# Patient Record
Sex: FEMALE | Race: WHITE | NOT HISPANIC OR LATINO | Employment: FULL TIME | URBAN - METROPOLITAN AREA
[De-identification: names, ages, dates, MRNs, and addresses within clinical notes are randomized per-mention and may not be internally consistent; named-entity substitution may affect disease eponyms.]

---

## 2017-08-11 ENCOUNTER — TRANSCRIBE ORDERS (OUTPATIENT)
Dept: ADMINISTRATIVE | Facility: HOSPITAL | Age: 47
End: 2017-08-11

## 2017-08-11 DIAGNOSIS — Z12.31 VISIT FOR SCREENING MAMMOGRAM: Primary | ICD-10-CM

## 2017-09-28 ENCOUNTER — HOSPITAL ENCOUNTER (OUTPATIENT)
Dept: MAMMOGRAPHY | Facility: HOSPITAL | Age: 47
Discharge: HOME | End: 2017-09-28
Attending: OBSTETRICS & GYNECOLOGY | Admitting: OBSTETRICS & GYNECOLOGY

## 2017-09-28 DIAGNOSIS — Z12.31 VISIT FOR SCREENING MAMMOGRAM: ICD-10-CM

## 2017-09-28 PROCEDURE — 77063 BREAST TOMOSYNTHESIS BI: CPT

## 2017-09-28 PROCEDURE — G0202 SCR MAMMO BI INCL CAD: HCPCS

## 2017-10-02 PROCEDURE — 77067 SCR MAMMO BI INCL CAD: CPT | Performed by: RADIOLOGY

## 2017-10-02 PROCEDURE — 77063 BREAST TOMOSYNTHESIS BI: CPT | Performed by: RADIOLOGY

## 2019-06-05 ENCOUNTER — TRANSCRIBE ORDERS (OUTPATIENT)
Dept: CT IMAGING | Facility: HOSPITAL | Age: 49
End: 2019-06-05

## 2019-06-05 ENCOUNTER — HOSPITAL ENCOUNTER (OUTPATIENT)
Dept: CT IMAGING | Facility: HOSPITAL | Age: 49
Discharge: HOME | End: 2019-06-05
Admitting: FAMILY MEDICINE

## 2019-06-05 DIAGNOSIS — E78.5 HYPERLIPIDEMIA, UNSPECIFIED HYPERLIPIDEMIA TYPE: ICD-10-CM

## 2019-06-05 DIAGNOSIS — R07.9 CHEST PAIN, UNSPECIFIED TYPE: Primary | ICD-10-CM

## 2019-06-05 DIAGNOSIS — R07.9 CHEST PAIN, UNSPECIFIED TYPE: ICD-10-CM

## 2019-06-05 PROCEDURE — 71275 CT ANGIOGRAPHY CHEST: CPT

## 2019-06-05 PROCEDURE — 25010000002 IOPAMIDOL 61 % SOLUTION: Performed by: FAMILY MEDICINE

## 2019-06-05 RX ADMIN — IOPAMIDOL 100 ML: 612 INJECTION, SOLUTION INTRAVENOUS at 17:10

## 2020-10-29 ENCOUNTER — CLINICAL SUPPORT (OUTPATIENT)
Dept: OBSTETRICS AND GYNECOLOGY | Facility: CLINIC | Age: 50
End: 2020-10-29

## 2020-10-29 DIAGNOSIS — Z30.42 ENCOUNTER FOR DEPO-PROVERA CONTRACEPTION: Primary | ICD-10-CM

## 2020-10-29 PROCEDURE — 96372 THER/PROPH/DIAG INJ SC/IM: CPT | Performed by: NURSE PRACTITIONER

## 2020-10-29 RX ORDER — MEDROXYPROGESTERONE ACETATE 150 MG/ML
150 INJECTION, SUSPENSION INTRAMUSCULAR ONCE
Status: COMPLETED | OUTPATIENT
Start: 2020-10-29 | End: 2020-10-29

## 2020-10-29 RX ADMIN — MEDROXYPROGESTERONE ACETATE 150 MG: 150 INJECTION, SUSPENSION INTRAMUSCULAR at 10:48

## 2021-01-08 ENCOUNTER — IMMUNIZATION (OUTPATIENT)
Dept: VACCINE CLINIC | Facility: HOSPITAL | Age: 51
End: 2021-01-08

## 2021-01-08 PROCEDURE — 91301 HC SARSCO02 VAC 100MCG/0.5ML IM: CPT | Performed by: INTERNAL MEDICINE

## 2021-01-08 PROCEDURE — 0011A: CPT | Performed by: INTERNAL MEDICINE

## 2021-01-26 ENCOUNTER — CLINICAL SUPPORT (OUTPATIENT)
Dept: OBSTETRICS AND GYNECOLOGY | Facility: CLINIC | Age: 51
End: 2021-01-26

## 2021-01-26 DIAGNOSIS — Z30.42 ENCOUNTER FOR DEPO-PROVERA CONTRACEPTION: Primary | ICD-10-CM

## 2021-01-26 PROCEDURE — 96372 THER/PROPH/DIAG INJ SC/IM: CPT | Performed by: OBSTETRICS & GYNECOLOGY

## 2021-01-26 RX ORDER — MEDROXYPROGESTERONE ACETATE 150 MG/ML
150 INJECTION, SUSPENSION INTRAMUSCULAR ONCE
Status: COMPLETED | OUTPATIENT
Start: 2021-01-26 | End: 2021-01-26

## 2021-01-26 RX ADMIN — MEDROXYPROGESTERONE ACETATE 150 MG: 150 INJECTION, SUSPENSION INTRAMUSCULAR at 12:25

## 2021-02-01 ENCOUNTER — IMMUNIZATION (OUTPATIENT)
Dept: VACCINE CLINIC | Facility: HOSPITAL | Age: 51
End: 2021-02-01

## 2021-02-01 PROCEDURE — 0012A: CPT | Performed by: INTERNAL MEDICINE

## 2021-02-01 PROCEDURE — 91301 HC SARSCO02 VAC 100MCG/0.5ML IM: CPT | Performed by: INTERNAL MEDICINE

## 2021-04-20 ENCOUNTER — CLINICAL SUPPORT (OUTPATIENT)
Dept: OBSTETRICS AND GYNECOLOGY | Facility: CLINIC | Age: 51
End: 2021-04-20

## 2021-04-20 VITALS
HEIGHT: 68 IN | DIASTOLIC BLOOD PRESSURE: 72 MMHG | SYSTOLIC BLOOD PRESSURE: 116 MMHG | BODY MASS INDEX: 37.59 KG/M2 | WEIGHT: 248 LBS

## 2021-04-20 DIAGNOSIS — Z30.42 SURVEILLANCE FOR DEPO-PROVERA CONTRACEPTION: Primary | ICD-10-CM

## 2021-04-20 PROCEDURE — 96372 THER/PROPH/DIAG INJ SC/IM: CPT | Performed by: OBSTETRICS & GYNECOLOGY

## 2021-04-20 RX ORDER — MEDROXYPROGESTERONE ACETATE 150 MG/ML
150 INJECTION, SUSPENSION INTRAMUSCULAR ONCE
Status: COMPLETED | OUTPATIENT
Start: 2021-04-20 | End: 2021-04-20

## 2021-04-20 RX ADMIN — MEDROXYPROGESTERONE ACETATE 150 MG: 150 INJECTION, SUSPENSION INTRAMUSCULAR at 12:50

## 2021-04-20 NOTE — PROGRESS NOTES
DepoProvera Contraception Note  Marnie An is a 50 y.o. female here for her DepoProvera injection. She weighs 248 lbs and she is 5feet 8inches tall. Marnie An is sexually active. The patient's last pap smear was 07/31/2020. Her last injection was 01/26/2021. Her past reactions to this injection include:none. It has been exactly 12 weeks since her last injection. Her last annual exam was 07/31/2020. Give left hip - pt tolerated well. Weight same 248 lbs - planning to start working out    Patient Questions  Have you ever passed out from an injection? No     Have you ever had a reaction to an injection? No    Are you allergic to any medications? No    Have you been sick in the last month? No    Injection Details  Noted in the MAR.  Office supplied depo    Next Injection  Her next injection is 07/13/2021 to 7/20/21  Her annual is 8/3/2021.    Electronically Signed By:  Arjun Santacruz RN  04/20/2021

## 2021-07-13 ENCOUNTER — CLINICAL SUPPORT (OUTPATIENT)
Dept: OBSTETRICS AND GYNECOLOGY | Facility: CLINIC | Age: 51
End: 2021-07-13

## 2021-07-13 VITALS
SYSTOLIC BLOOD PRESSURE: 122 MMHG | WEIGHT: 251 LBS | DIASTOLIC BLOOD PRESSURE: 82 MMHG | HEIGHT: 68 IN | BODY MASS INDEX: 38.04 KG/M2

## 2021-07-13 DIAGNOSIS — Z30.42 SURVEILLANCE FOR DEPO-PROVERA CONTRACEPTION: Primary | ICD-10-CM

## 2021-07-13 PROCEDURE — 96372 THER/PROPH/DIAG INJ SC/IM: CPT | Performed by: OBSTETRICS & GYNECOLOGY

## 2021-07-13 RX ORDER — MEDROXYPROGESTERONE ACETATE 150 MG/ML
INJECTION, SUSPENSION INTRAMUSCULAR
COMMUNITY
End: 2021-07-13 | Stop reason: SDUPTHER

## 2021-07-13 RX ORDER — FEXOFENADINE HCL 180 MG/1
TABLET ORAL
COMMUNITY
End: 2023-01-05

## 2021-07-13 RX ORDER — MEDROXYPROGESTERONE ACETATE 150 MG/ML
150 INJECTION, SUSPENSION INTRAMUSCULAR ONCE
Status: COMPLETED | OUTPATIENT
Start: 2021-07-13 | End: 2021-07-13

## 2021-07-13 RX ORDER — ROSUVASTATIN CALCIUM 10 MG/1
10 TABLET, COATED ORAL DAILY
COMMUNITY
Start: 2021-07-03

## 2021-07-13 RX ADMIN — MEDROXYPROGESTERONE ACETATE 150 MG: 150 INJECTION, SUSPENSION INTRAMUSCULAR at 11:04

## 2021-07-13 NOTE — PROGRESS NOTES
DepoProvera Contraception Note  Marnie An is a 51 y.o. female here for her DepoProvera injection.She weighs 251lbs and she  is 5feet 8inches tall. Marnie An is sexually active. The patient's last pap smear was 7/20 . Her last injection was 4-20-21. It has been  12 weeks since her last injection. Her last annual exam was 7/20.    Patient Questions  Have you ever passed out from an injection? No     Have you ever had a reaction to an injection? No    Are you allergic to any medications? See drug allergies    Have you been sick in the last month? No    Injection Details  Noted in the MAR.    Next Injection  Her next injection is 10/5/2021    Electronically Signed By:  Sandy Montgomery MA  07/13/2021          Pt uncle was able to reach pt mother per telephone who is at work and she states she is not coming since she is at work. .  Pt restless, vomited x1, no pills found in vomit.        Monika Bill RN  01/18/20 1730

## 2021-08-09 ENCOUNTER — OFFICE VISIT (OUTPATIENT)
Dept: OBSTETRICS AND GYNECOLOGY | Facility: CLINIC | Age: 51
End: 2021-08-09

## 2021-08-09 VITALS
WEIGHT: 248 LBS | SYSTOLIC BLOOD PRESSURE: 128 MMHG | DIASTOLIC BLOOD PRESSURE: 80 MMHG | BODY MASS INDEX: 37.59 KG/M2 | HEIGHT: 68 IN

## 2021-08-09 DIAGNOSIS — Z01.419 PAP TEST, AS PART OF ROUTINE GYNECOLOGICAL EXAMINATION: Primary | ICD-10-CM

## 2021-08-09 DIAGNOSIS — Z30.42 ENCOUNTER FOR SURVEILLANCE OF INJECTABLE CONTRACEPTIVE: ICD-10-CM

## 2021-08-09 PROCEDURE — 99396 PREV VISIT EST AGE 40-64: CPT | Performed by: OBSTETRICS & GYNECOLOGY

## 2021-08-09 RX ORDER — MEDROXYPROGESTERONE ACETATE 150 MG/ML
150 INJECTION, SUSPENSION INTRAMUSCULAR
COMMUNITY
End: 2022-09-06 | Stop reason: SDUPTHER

## 2021-08-09 RX ORDER — HALOBETASOL PROPIONATE 0.05 %
OINTMENT (GRAM) TOPICAL
COMMUNITY
Start: 2021-07-15

## 2021-08-09 NOTE — PROGRESS NOTES
GYN Annual Exam     CC - Here for annual exam.   Last BDS 2018  On Depo for 20 plus years    Subjective   HPI  Marnie An is a 51 y.o. female, , who presents for annual well woman exam.   Patient's last menstrual period was 2005 (exact date)..  Periods are absent with Depo  Dysmenorrhea:none.  Patient reports problems with: some night sweats. Some bilateral breast tenderness   Partner Status: Marital Status: .  New Partners since last visit:  no.  Desires STD Screening:  no.    Additional OB/GYN History   Current contraception: Depo last injection 2021  Last Pap :   Last Completed Pap Smear          Ordered - PAP SMEAR (Every 3 Years) Ordered on 2020  Done - in [place]    2018  Done - in [place]              History of abnormal Pap smear: no  Family history of uterine, colon, breast, or ovarian cancer: yes PGM breast cancer  Performs monthly Self-Breast Exam: yes  Last mammogram: overdue for mamm - pt will call to schedule NEEDS ORDER  Last Completed Mammogram     This patient has no relevant Health Maintenance data.        Feelings of Anxiety or Depression: no  Tobacco Usage?: No   OB History        1    Para   1    Term   0       1    AB        Living   1       SAB        TAB        Ectopic        Molar        Multiple        Live Births   1                Health Maintenance   Topic Date Due   • Annual Gynecologic Pelvic and Breast Exam  Never done   • COLORECTAL CANCER SCREENING  Never done   • ANNUAL PHYSICAL  Never done   • TDAP/TD VACCINES (1 - Tdap) Never done   • HEPATITIS C SCREENING  Never done   • MAMMOGRAM  2021   • LIPID PANEL  2021   • INFLUENZA VACCINE  10/01/2021   • DXA SCAN  10/29/2022   • PAP SMEAR  2023   • COVID-19 Vaccine  Completed   • Pneumococcal Vaccine 0-64  Aged Out         Current Outpatient Medications:   •  fexofenadine (Allegra Allergy) 180 MG tablet, Allegra, Disp: , Rfl:   •  halobetasol  "(ULTRAVATE) 0.05 % ointment, APPLY TWO TIMES A DAY    TO AFFECTED AREA AS NEEDED FOR 14 DAYS, Disp: , Rfl:   •  medroxyPROGESTERone (Depo-Provera) 150 MG/ML injection, Inject 150 mg into the appropriate muscle as directed by prescriber Every 3 (Three) Months., Disp: , Rfl:   •  rosuvastatin (CRESTOR) 10 MG tablet, Take 10 mg by mouth Daily. 200001, Disp: , Rfl:     The additional following portions of the patient's history were reviewed and updated as appropriate: current medications, past family history, past medical history, past social history, past surgical history and problem list.    Review of Systems   Constitutional: Negative.    HENT: Negative.    Eyes: Negative.    Respiratory: Negative.    Cardiovascular: Negative.    Gastrointestinal: Negative.    Endocrine: Negative.    Genitourinary: Negative.    Musculoskeletal: Negative.    Skin: Negative.    Allergic/Immunologic: Negative.    Neurological: Negative.    Hematological: Negative.    Psychiatric/Behavioral: Negative.        I have reviewed and agree with the HPI, ROS, and historical information as entered above. Collins Boykin MD    Objective   /80   Ht 172.7 cm (68\")   Wt 112 kg (248 lb)   LMP 12/01/2005 (Exact Date)   Breastfeeding No   BMI 37.71 kg/m²     PE    Breast: Without masses ,nontender, no skin changes or retractions  Axilla: Normal, no lymphadenopathy  Heart: Regular rate no murmurs rubs or gallops  Lungs: Clear to auscultation, normal breath sounds bilaterally  Abdomen: Soft nontender, no hepatosplenomegaly, no guarding or rebound, no masses  Pelvic exam  External genitalia: Normal introitus and vulva  Vagina: Normal mucosa no bleeding inflammation or discharge  Bladder: Normal position nontender  Urethral meatus and urethra: Normal nontender  Cervix: No lesions, no discharge, bleeding or inflammation  Bimanual: Nontender adnexa clear, no sign of uterine or ovarian enlargement       Assessment/Plan     Assessment "     Problem List Items Addressed This Visit        Genitourinary and Reproductive     Pap test, as part of routine gynecological examination - Primary    Relevant Orders    Pap IG, HPV-hr    Encounter for surveillance of injectable contraceptive          1. GYN annual well woman exam.   2. Continue Depo-Provera every 3 months    Plan     1. Fibrocystic breast changes - Encouraged decreasing caffeine, supportive bra, low dose vitamin E supplementation.  2. Reviewed HPV guidelines  3. Reviewed monthly self breast exams.  Instructed to call with lumps, pain, or breast discharge.  Yearly mammograms ordered.  Depo-Provera every 3 months  Preventative health initiatives reviewed    Collins Boykin MD  08/09/2021

## 2021-08-24 ENCOUNTER — TRANSCRIBE ORDERS (OUTPATIENT)
Dept: ADMINISTRATIVE | Facility: HOSPITAL | Age: 51
End: 2021-08-24

## 2021-08-24 DIAGNOSIS — Z12.31 VISIT FOR SCREENING MAMMOGRAM: Primary | ICD-10-CM

## 2021-08-31 ENCOUNTER — OFFICE VISIT (OUTPATIENT)
Dept: ORTHOPEDIC SURGERY | Facility: CLINIC | Age: 51
End: 2021-08-31

## 2021-08-31 VITALS
HEIGHT: 68 IN | DIASTOLIC BLOOD PRESSURE: 74 MMHG | RESPIRATION RATE: 18 BRPM | TEMPERATURE: 96.9 F | WEIGHT: 250 LBS | BODY MASS INDEX: 37.89 KG/M2 | SYSTOLIC BLOOD PRESSURE: 120 MMHG

## 2021-08-31 DIAGNOSIS — M25.562 ARTHRALGIA OF LEFT KNEE: Primary | ICD-10-CM

## 2021-08-31 DIAGNOSIS — M76.32 IT BAND SYNDROME, LEFT: ICD-10-CM

## 2021-08-31 DIAGNOSIS — M17.11 PRIMARY OSTEOARTHRITIS OF RIGHT KNEE: ICD-10-CM

## 2021-08-31 DIAGNOSIS — M22.2X1 PATELLOFEMORAL DISORDER OF BOTH KNEES: ICD-10-CM

## 2021-08-31 DIAGNOSIS — M17.12 PRIMARY OSTEOARTHRITIS OF LEFT KNEE: ICD-10-CM

## 2021-08-31 DIAGNOSIS — M22.2X2 PATELLOFEMORAL DISORDER OF BOTH KNEES: ICD-10-CM

## 2021-08-31 DIAGNOSIS — M17.0 PRIMARY OSTEOARTHRITIS OF BOTH KNEES: ICD-10-CM

## 2021-08-31 PROCEDURE — 99203 OFFICE O/P NEW LOW 30 MIN: CPT | Performed by: PHYSICIAN ASSISTANT

## 2021-08-31 PROCEDURE — 20610 DRAIN/INJ JOINT/BURSA W/O US: CPT | Performed by: PHYSICIAN ASSISTANT

## 2021-08-31 RX ORDER — TRIAMCINOLONE ACETONIDE 40 MG/ML
40 INJECTION, SUSPENSION INTRA-ARTICULAR; INTRAMUSCULAR
Status: COMPLETED | OUTPATIENT
Start: 2021-08-31 | End: 2021-08-31

## 2021-08-31 RX ORDER — LIDOCAINE HYDROCHLORIDE 10 MG/ML
2 INJECTION, SOLUTION INFILTRATION; PERINEURAL
Status: COMPLETED | OUTPATIENT
Start: 2021-08-31 | End: 2021-08-31

## 2021-08-31 RX ADMIN — LIDOCAINE HYDROCHLORIDE 2 ML: 10 INJECTION, SOLUTION INFILTRATION; PERINEURAL at 10:08

## 2021-08-31 RX ADMIN — TRIAMCINOLONE ACETONIDE 40 MG: 40 INJECTION, SUSPENSION INTRA-ARTICULAR; INTRAMUSCULAR at 10:08

## 2021-08-31 NOTE — PROGRESS NOTES
"Subjective   Patient ID: Marnie An is a 51 y.o. right hand dominant female  Pain of the Left Knee (States her right knee aches at night for around 6 months to a year, her left knee has sharp pains and has been locking up and clicking for 3-6 months.) and Pain of the Right Knee         History of Present Illness  Patient presents as a new patient with complaints of bilateral knee discomfort left greater than right.  She states the right knee does have an aching sensation worse at night.  The left knee experiences sharp pains that clicks and will occasionally lock up.  This has been ongoing for the last 6 months.  She does take occasional Tylenol.  She cannot tolerate anti-inflammatories due to \"kidney issues\"  Patient denies numbness or tingling to the lower extremities.  Patient also mentions at times her left knee feels \"weak\"                                                 Past Medical History:   Diagnosis Date   • Esophageal stricture     stretched, [place]   • Hyperlipidemia     pt and family        Past Surgical History:   Procedure Laterality Date   • BREAST BIOPSY Right 07/12/2012    Ultrasound guided   • KNEE ARTHROSCOPY  03/2014    right torn meniscus        Family History   Problem Relation Age of Onset   • Breast cancer Paternal Grandmother         80's   • Hearing loss Mother    • Heart disease Mother    • Hyperlipidemia Mother    • Hypertension Mother    • Heart disease Father    • Hyperlipidemia Father    • Ovarian cancer Neg Hx        Social History     Socioeconomic History   • Marital status:      Spouse name: Not on file   • Number of children: Not on file   • Years of education: Not on file   • Highest education level: Not on file   Tobacco Use   • Smoking status: Never Smoker   • Smokeless tobacco: Never Used   Substance and Sexual Activity   • Alcohol use: Yes     Comment: social   • Drug use: Never         Current Outpatient Medications:   •  fexofenadine (Allegra Allergy) " "180 MG tablet, Allegra, Disp: , Rfl:   •  halobetasol (ULTRAVATE) 0.05 % ointment, APPLY TWO TIMES A DAY    TO AFFECTED AREA AS NEEDED FOR 14 DAYS, Disp: , Rfl:   •  medroxyPROGESTERone (Depo-Provera) 150 MG/ML injection, Inject 150 mg into the appropriate muscle as directed by prescriber Every 3 (Three) Months., Disp: , Rfl:   •  rosuvastatin (CRESTOR) 10 MG tablet, Take 10 mg by mouth Daily. 200001, Disp: , Rfl:     Allergies   Allergen Reactions   • E.E.S. [Erythromycin] Other (See Comments)     Abdominal pain       Review of Systems   Constitutional: Negative for fever.   HENT: Negative for dental problem and voice change.    Eyes: Negative for visual disturbance.   Respiratory: Negative for shortness of breath.    Cardiovascular: Negative for chest pain.   Gastrointestinal: Negative for abdominal pain.   Genitourinary: Negative for dysuria.   Musculoskeletal: Positive for arthralgias (bilateral knee) and joint swelling (bilateral knee). Negative for gait problem.   Skin: Negative for rash.   Neurological: Negative for speech difficulty.   Hematological: Does not bruise/bleed easily.   Psychiatric/Behavioral: Negative for confusion.       I have reviewed the medical and surgical history, family history, social history, medications, and/or allergies, and the review of systems of this report.    Objective   /74 (BP Location: Left arm, Patient Position: Sitting, Cuff Size: Large Adult)   Temp 96.9 °F (36.1 °C)   Resp 18   Ht 172.7 cm (67.99\")   Wt 113 kg (250 lb)   LMP  (LMP Unknown)   Breastfeeding No   BMI 38.02 kg/m²    Physical Exam  Vitals and nursing note reviewed.   Constitutional:       Appearance: Normal appearance.   Pulmonary:      Effort: Pulmonary effort is normal.   Musculoskeletal:      Right knee: No effusion.      Left knee: No effusion.      Instability Tests: Medial Prasanth test negative and lateral Prasanth test negative.   Neurological:      Mental Status: She is alert and " oriented to person, place, and time.       Right Knee Exam     Tenderness   The patient is experiencing tenderness in the medial joint line.    Other   Erythema: absent  Effusion: no effusion present      Left Knee Exam     Tenderness   The patient is experiencing tenderness in the lateral joint line and medial joint line (distal IT band).    Tests   Prasanth:  Medial - negative Lateral - negative  Varus: positive Valgus: negative  Lachman:  Anterior - negative    Posterior - negative  Patellar apprehension: 1+    Other   Erythema: absent  Sensation: normal  Pulse: present  Effusion: no effusion present           Extremity DVT signs are negative on physical exam with negative Samir sign, no calf pain, no palpable cords and no skin tone change   Neurologic Exam     Mental Status   Oriented to person, place, and time.          Positive bilateral knee crepitus    Assessment/Plan   Independent Review of Radiographic Studies:    X-ray of the left knee 2 view weightbearing performed in the office reveals medial joint space narrowing with patellofemoral degenerative changes.  No acute fracture.  The AP view of the right knee does reveal severe medial joint space bone-on-bone interaction without acute process      Large Joint Arthrocentesis: R knee  Date/Time: 8/31/2021 10:08 AM  Consent given by: patient  Site marked: site marked  Timeout: Immediately prior to procedure a time out was called to verify the correct patient, procedure, equipment, support staff and site/side marked as required   Supporting Documentation  Indications: pain   Procedure Details  Location: knee - R knee  Preparation: Patient was prepped and draped in the usual sterile fashion  Needle size: 22 G  Approach: anterolateral  Medications administered: 2 mL lidocaine 1 %; 40 mg triamcinolone acetonide 40 MG/ML  Patient tolerance: patient tolerated the procedure well with no immediate complications    Large Joint Arthrocentesis: L knee  Date/Time:  8/31/2021 10:08 AM  Consent given by: patient  Site marked: site marked  Timeout: Immediately prior to procedure a time out was called to verify the correct patient, procedure, equipment, support staff and site/side marked as required   Supporting Documentation  Indications: pain   Procedure Details  Location: knee - L knee  Preparation: Patient was prepped and draped in the usual sterile fashion  Needle size: 22 G  Approach: anterolateral  Medications administered: 2 mL lidocaine 1 %; 40 mg triamcinolone acetonide 40 MG/ML  Patient tolerance: patient tolerated the procedure well with no immediate complications             Diagnoses and all orders for this visit:    1. Arthralgia of left knee (Primary)  -     XR Knee 1 or 2 View Left; Future  -     Ambulatory Referral to Physical Therapy Evaluate and treat, Ortho; Heat, Electrotherapy; Ultrasound; Strengthening (quad/vmo), ROM, Stretching    2. Patellofemoral disorder of both knees  -     Ambulatory Referral to Physical Therapy Evaluate and treat, Ortho; Heat, Electrotherapy; Ultrasound; Strengthening (quad/vmo), ROM, Stretching    3. It band syndrome, left  -     Ambulatory Referral to Physical Therapy Evaluate and treat, Ortho; Heat, Electrotherapy; Ultrasound; Strengthening (quad/vmo), ROM, Stretching    4. Primary osteoarthritis of both knees    5. Primary osteoarthritis of right knee    6. Primary osteoarthritis of left knee    Other orders  -     Large Joint Arthrocentesis: R knee  -     Large Joint Arthrocentesis: L knee       Orthopedic activities reviewed and patient expressed appreciation  Discussion of orthopedic goals  Risk, benefits, and merits of treatment alternatives reviewed with the patient and questions answered  Call or notify for any adverse effect from injection therapy  Reduced physical activity as appropriate  Weight bearing parameters reviewed  Ice, heat, and/or modalities as beneficial    Recommendations/Plan:  Exercise, medications,  injections, other patient advice, and return appointment as noted.  Patient is encouraged to call or return for any issues or concerns.    Follow up in 3 months-if no improvement patient would benefit from MRI left knee    Patient agreeable to call or return sooner for any concerns.               EMR Dragon-transcription disclaimer:  This encounter note is an electronic transcription of spoken language to printed text.  Electronic transcription of spoken language may permit erroneous or at times nonsensical words or phrases to be inadvertently transcribed.  Although I have reviewed the note for such errors, some may still exist

## 2021-09-23 ENCOUNTER — TRANSCRIBE ORDERS (OUTPATIENT)
Dept: LAB | Facility: HOSPITAL | Age: 51
End: 2021-09-23

## 2021-09-23 ENCOUNTER — LAB (OUTPATIENT)
Dept: LAB | Facility: HOSPITAL | Age: 51
End: 2021-09-23

## 2021-09-23 DIAGNOSIS — Z20.822 COVID-19 RULED OUT: Primary | ICD-10-CM

## 2021-09-23 DIAGNOSIS — Z20.822 COVID-19 RULED OUT: ICD-10-CM

## 2021-09-23 PROCEDURE — U0004 COV-19 TEST NON-CDC HGH THRU: HCPCS

## 2021-09-24 LAB — SARS-COV-2 RNA NOSE QL NAA+PROBE: NOT DETECTED

## 2021-10-05 ENCOUNTER — CLINICAL SUPPORT (OUTPATIENT)
Dept: OBSTETRICS AND GYNECOLOGY | Facility: CLINIC | Age: 51
End: 2021-10-05

## 2021-10-05 DIAGNOSIS — Z30.42 ENCOUNTER FOR SURVEILLANCE OF INJECTABLE CONTRACEPTIVE: Primary | ICD-10-CM

## 2021-10-05 PROCEDURE — 96372 THER/PROPH/DIAG INJ SC/IM: CPT | Performed by: OBSTETRICS & GYNECOLOGY

## 2021-10-05 RX ORDER — MEDROXYPROGESTERONE ACETATE 150 MG/ML
150 INJECTION, SUSPENSION INTRAMUSCULAR ONCE
Status: COMPLETED | OUTPATIENT
Start: 2021-10-05 | End: 2021-10-05

## 2021-10-05 RX ADMIN — MEDROXYPROGESTERONE ACETATE 150 MG: 150 INJECTION, SUSPENSION INTRAMUSCULAR at 09:09

## 2021-10-05 NOTE — PROGRESS NOTES
DepoProvera Contraception Note  Marnie An is a 51 y.o. female here for her DepoProvera injection. Her LMP was N/A. She weighs 246lbs and she is 6feet 8inches tall. Marnie An is sexually active. The patient's last pap smear was 8/9/21. Her last injection was 7/13/21. Her past reactions to this injection include:none. It has been less than 12 weeks since her last injection. Her last annual exam was 8/9/21.    Patient Questions  Have you ever passed out from an injection? No     Have you ever had a reaction to an injection? No    Are you allergic to any medications? No    Have you been sick in the last month? No    Injection Details  Noted in the MAR.    Next Injection  Her next injection is 12/28/21.    Electronically Signed By:  Zully Callaway RN  10/05/2021

## 2021-10-11 ENCOUNTER — HOSPITAL ENCOUNTER (OUTPATIENT)
Dept: MAMMOGRAPHY | Facility: HOSPITAL | Age: 51
Discharge: HOME | End: 2021-10-11
Admitting: OBSTETRICS & GYNECOLOGY

## 2021-10-11 DIAGNOSIS — Z12.31 VISIT FOR SCREENING MAMMOGRAM: ICD-10-CM

## 2021-10-11 PROCEDURE — 77063 BREAST TOMOSYNTHESIS BI: CPT | Performed by: RADIOLOGY

## 2021-10-11 PROCEDURE — 77063 BREAST TOMOSYNTHESIS BI: CPT

## 2021-10-11 PROCEDURE — 77067 SCR MAMMO BI INCL CAD: CPT

## 2021-10-11 PROCEDURE — 77067 SCR MAMMO BI INCL CAD: CPT | Performed by: RADIOLOGY

## 2021-12-28 ENCOUNTER — CLINICAL SUPPORT (OUTPATIENT)
Dept: OBSTETRICS AND GYNECOLOGY | Facility: CLINIC | Age: 51
End: 2021-12-28

## 2021-12-28 VITALS
SYSTOLIC BLOOD PRESSURE: 126 MMHG | WEIGHT: 246 LBS | HEIGHT: 68 IN | DIASTOLIC BLOOD PRESSURE: 80 MMHG | BODY MASS INDEX: 37.28 KG/M2

## 2021-12-28 DIAGNOSIS — Z30.42 SURVEILLANCE FOR DEPO-PROVERA CONTRACEPTION: Primary | ICD-10-CM

## 2021-12-28 PROCEDURE — 96372 THER/PROPH/DIAG INJ SC/IM: CPT | Performed by: OBSTETRICS & GYNECOLOGY

## 2021-12-28 RX ORDER — MEDROXYPROGESTERONE ACETATE 150 MG/ML
150 INJECTION, SUSPENSION INTRAMUSCULAR ONCE
Status: COMPLETED | OUTPATIENT
Start: 2021-12-28 | End: 2021-12-28

## 2021-12-28 RX ADMIN — MEDROXYPROGESTERONE ACETATE 150 MG: 150 INJECTION, SUSPENSION INTRAMUSCULAR at 15:35

## 2021-12-28 NOTE — PROGRESS NOTES
DepoProvera Contraception Note  Marnie An is a 51 y.o. female here for her DepoProvera injection. Her LMP was years ago. She weighs 245 lbslbs and she is 5feet 8inches tall. Marnie An is sexually active. The patient's last pap smear was 08/2021. Her last injection was10/05/2021. Her past reactions to this injection include:none. It has been less than 12 weeks since her last injection. Her last annual exam was [id number].    Patient Questions  Have you ever passed out from an injection? No     Have you ever had a reaction to an injection? No    Are you allergic to any medications? Yes EES - GI issues    Have you been sick in the last month? No    Injection Details  Noted in the MAR. Give right upper outer quadrant, 25 gauge needle 1 1/2 in, pt tolerated well    Next Injection  Her next injection is 03/22/2022.    Electronically Signed By:  Arjun Santacruz RN  12/28/2021

## 2022-03-22 ENCOUNTER — CLINICAL SUPPORT (OUTPATIENT)
Dept: OBSTETRICS AND GYNECOLOGY | Facility: CLINIC | Age: 52
End: 2022-03-22

## 2022-03-22 VITALS — WEIGHT: 250.8 LBS | BODY MASS INDEX: 38.13 KG/M2

## 2022-03-22 DIAGNOSIS — Z30.42 SURVEILLANCE FOR DEPO-PROVERA CONTRACEPTION: Primary | ICD-10-CM

## 2022-03-22 PROCEDURE — 96372 THER/PROPH/DIAG INJ SC/IM: CPT | Performed by: OBSTETRICS & GYNECOLOGY

## 2022-03-22 RX ORDER — MEDROXYPROGESTERONE ACETATE 150 MG/ML
150 INJECTION, SUSPENSION INTRAMUSCULAR ONCE
Status: COMPLETED | OUTPATIENT
Start: 2022-03-22 | End: 2022-03-22

## 2022-03-22 RX ADMIN — MEDROXYPROGESTERONE ACETATE 150 MG: 150 INJECTION, SUSPENSION INTRAMUSCULAR at 09:22

## 2022-03-22 NOTE — PROGRESS NOTES
DepoProvera Contraception Note  Marnie An is a 51 y.o. female here for her DepoProvera injection.. She weighs 250.8lbs lbs and she is 5feet 7inches tall. Marnie An is sexually active and is not sexually active. The patient's last pap smear was 8/9/21. Her last injection was 12/28/21. Her past reactions to this injection include:none. It has been more than 12 weeks since her last injection. Her last annual exam was 8/9/21.    Patient Questions  Have you ever passed out from an injection? No     Have you ever had a reaction to an injection? No    Are you allergic to any medications? No    Have you been sick in the last month? No    Injection Details  Noted in the MAR.    Next Injection  Her next injection is 6/14/22    Electronically Signed By:  Elana Mello, PCT  03/22/2022

## 2022-06-14 ENCOUNTER — CLINICAL SUPPORT (OUTPATIENT)
Dept: OBSTETRICS AND GYNECOLOGY | Facility: CLINIC | Age: 52
End: 2022-06-14

## 2022-06-14 VITALS
DIASTOLIC BLOOD PRESSURE: 70 MMHG | SYSTOLIC BLOOD PRESSURE: 124 MMHG | HEIGHT: 68 IN | WEIGHT: 245.8 LBS | BODY MASS INDEX: 37.25 KG/M2

## 2022-06-14 DIAGNOSIS — Z30.9 ENCOUNTER FOR CONTRACEPTIVE MANAGEMENT, UNSPECIFIED TYPE: Primary | ICD-10-CM

## 2022-06-14 PROCEDURE — 96372 THER/PROPH/DIAG INJ SC/IM: CPT | Performed by: NURSE PRACTITIONER

## 2022-06-14 RX ORDER — MEDROXYPROGESTERONE ACETATE 150 MG/ML
150 INJECTION, SUSPENSION INTRAMUSCULAR ONCE
Status: COMPLETED | OUTPATIENT
Start: 2022-06-14 | End: 2022-06-14

## 2022-06-14 RX ADMIN — MEDROXYPROGESTERONE ACETATE 150 MG: 150 INJECTION, SUSPENSION INTRAMUSCULAR at 12:54

## 2022-06-14 NOTE — PROGRESS NOTES
DepoProvera Contraception Note  Marnie An is a 52 y.o. female here for her Depo Provera injection. Her LMP was unknown due Depo Provera injection. She weighs 245lbs and she is 5feet 8inches tall. Mranie An is sexually active. The patient's last pap smear was 08/09/2021. Her last injection was 03/22/22. Her past reactions to this injection include:none. It has been 12 weeks since her last injection. Her last annual exam was 08/09/2021.    Patient Questions  Have you ever passed out from an injection? No     Have you ever had a reaction to an injection? No    Are you allergic to any medications? No    Have you been sick in the last month? No    Injection Details  Noted in the MAR.    Next Injection  Her next injection is  The week of 09/06/2022.    Electronically Signed By:  Myrna Goel MA  06/14/2022

## 2022-08-29 ENCOUNTER — TRANSCRIBE ORDERS (OUTPATIENT)
Dept: ADMINISTRATIVE | Facility: HOSPITAL | Age: 52
End: 2022-08-29

## 2022-08-29 DIAGNOSIS — Z12.31 SCREENING MAMMOGRAM FOR BREAST CANCER: Primary | ICD-10-CM

## 2022-09-06 ENCOUNTER — CLINICAL SUPPORT (OUTPATIENT)
Dept: OBSTETRICS AND GYNECOLOGY | Facility: CLINIC | Age: 52
End: 2022-09-06

## 2022-09-06 DIAGNOSIS — Z30.42 SURVEILLANCE FOR DEPO-PROVERA CONTRACEPTION: Primary | ICD-10-CM

## 2022-09-06 PROCEDURE — 96372 THER/PROPH/DIAG INJ SC/IM: CPT | Performed by: OBSTETRICS & GYNECOLOGY

## 2022-09-06 RX ORDER — MEDROXYPROGESTERONE ACETATE 150 MG/ML
150 INJECTION, SUSPENSION INTRAMUSCULAR
Status: SHIPPED | OUTPATIENT
Start: 2022-09-06

## 2022-09-06 RX ADMIN — MEDROXYPROGESTERONE ACETATE 150 MG: 150 INJECTION, SUSPENSION INTRAMUSCULAR at 09:12

## 2022-09-06 NOTE — PROGRESS NOTES
DepoProvera Contraception Note  Marnie An is a 52 y.o. female here for her DepoProvera injection. Her LMP was unknown. She weighs 249 lbs and she is 5feet 8inches tall. Marnie An is sexually active. The patient's last pap smear was 8/9/21. Her last injection was 6/14/22. Her past reactions to this injection include:none. It has been 12 weeks since her last injection. Her last annual exam was 8/9/21.    Patient Questions  Have you ever passed out from an injection? No     Have you ever had a reaction to an injection? No    Are you allergic to any medications? No    Have you been sick in the last month? No    Injection Details  Noted in the MAR.    Next Injection  Her next injection is 11/29/22.    Electronically Signed By:  Sarah Thomson RN  09/06/2022

## 2022-09-15 ENCOUNTER — OFFICE VISIT (OUTPATIENT)
Dept: OBSTETRICS AND GYNECOLOGY | Facility: CLINIC | Age: 52
End: 2022-09-15

## 2022-09-15 VITALS
WEIGHT: 251.6 LBS | DIASTOLIC BLOOD PRESSURE: 76 MMHG | BODY MASS INDEX: 38.13 KG/M2 | HEIGHT: 68 IN | SYSTOLIC BLOOD PRESSURE: 128 MMHG

## 2022-09-15 DIAGNOSIS — Z12.11 COLON CANCER SCREENING: ICD-10-CM

## 2022-09-15 DIAGNOSIS — Z01.419 WOMEN'S ANNUAL ROUTINE GYNECOLOGICAL EXAMINATION: Primary | ICD-10-CM

## 2022-09-15 DIAGNOSIS — Z12.31 BREAST CANCER SCREENING BY MAMMOGRAM: ICD-10-CM

## 2022-09-15 PROCEDURE — 99396 PREV VISIT EST AGE 40-64: CPT | Performed by: OBSTETRICS & GYNECOLOGY

## 2022-09-15 NOTE — PROGRESS NOTES
Gynecologic Annual Exam Note          GYN Annual Exam     Gynecologic Exam        Subjective     HPI  Marnie An is a 52 y.o. female, , who presents for annual well woman exam as a established patient . No LMP recorded. Patient has had an injection..  Periods are absent secondary to birth control. She states she is content with Depo injections. Patient reports problems with: none.  Partner Status: Marital Status: . She is is sexually active. She has not had new partners.. STD testing recommendations have been explained to the patient and she does not desire STD testing. There were no changes to her medical or surgical history since her last visit.. She does have knee replacement surgery scheduled for November.       Additional OB/GYN History   Current contraception: contraceptive methods: Depo-Provera injections  Desires to: continue contraception    Last Pap : 2021. Result: negative. HPV: negative  Last Completed Pap Smear          Ordered - PAP SMEAR (Every 3 Years) Ordered on 9/15/2022    2021  SCANNED - PAP SMEAR    2020  Done - in [place]    2018  Done - in [place]              History of abnormal Pap smear: no  Family history of uterine, colon, breast, or ovarian cancer: yes - paternal GM - breast  Performs monthly Self-Breast Exam: no  Last mammogram: 10/17/2021. Done at [facility]    Last Completed Mammogram          Scheduled - MAMMOGRAM (Yearly) Scheduled for 10/14/2022    10/11/2021  Mammo Screening Digital Tomosynthesis Bilateral With CAD    2017  Mammo Screening Digital Tomosynthesis Bilateral With CAD    09/15/2016  Mammo screening digital tomosynthesis bilateral w cad    2015  MAMMOGRAPHY SCREENING BILATERAL    08/15/2014  MAMMOGRAPHY DIAGNOSTIC BILATERAL                History of abnormal mammogram: yes - had biopsy, benign    Colonoscopy: has never had a colonoscopy.  Exercises Regularly: no  Feelings of Anxiety or Depression: no  Tobacco  Usage?: No       Current Outpatient Medications:   •  fexofenadine (ALLEGRA) 180 MG tablet, Allegra, Disp: , Rfl:   •  halobetasol (ULTRAVATE) 0.05 % ointment, APPLY TWO TIMES A DAY    TO AFFECTED AREA AS NEEDED FOR 14 DAYS, Disp: , Rfl:   •  rosuvastatin (CRESTOR) 10 MG tablet, Take 10 mg by mouth Daily. , Disp: , Rfl:     Current Facility-Administered Medications:   •  medroxyPROGESTERone (DEPO-PROVERA) injection 150 mg, 150 mg, Intramuscular, Q3 Months, Ann-Marie Acevedo MD, 150 mg at 22     Patient denies the need for medication refills today.    OB History        1    Para   1    Term   0       1    AB        Living   1       SAB        IAB        Ectopic        Molar        Multiple        Live Births   1                Past Medical History:   Diagnosis Date   • Esophageal stricture     stretched, [facility] [place]   • Hematuria 2021    CT SCAN and Cystoscopy with Dr. Fajardo   • Hyperlipidemia     pt and family        Past Surgical History:   Procedure Laterality Date   • BREAST BIOPSY Right 2012    Ultrasound guided   • CYSTOSCOPY     • ENDOSCOPY     • KNEE ARTHROSCOPY  2014    right torn meniscus        Health Maintenance   Topic Date Due   • COLORECTAL CANCER SCREENING  Never done   • ANNUAL PHYSICAL  Never done   • TDAP/TD VACCINES (1 - Tdap) Never done   • HEPATITIS C SCREENING  Never done   • LIPID PANEL  2021   • COVID-19 Vaccine (4 - Booster) 2022   • ZOSTER VACCINE (1 of 2) Never done   • Annual Gynecologic Pelvic and Breast Exam  08/10/2022   • INFLUENZA VACCINE  10/01/2022   • MAMMOGRAM  10/11/2022   • DXA SCAN  10/29/2022   • PAP SMEAR  2024   • Pneumococcal Vaccine 0-64  Aged Out       The additional following portions of the patient's history were reviewed and updated as appropriate: allergies, current medications, past family history, past medical history, past social history and past surgical history.    Review of Systems   Constitutional:  "Negative.    HENT: Negative.    Eyes: Negative.    Respiratory: Negative.    Cardiovascular: Negative.    Gastrointestinal: Negative.    Endocrine: Negative.    Genitourinary: Negative.    Musculoskeletal: Negative.    Allergic/Immunologic: Negative.    Neurological: Negative.    Hematological: Negative.    Psychiatric/Behavioral: Negative.          I have reviewed and agree with the HPI, ROS, and historical information as entered above. Ann-Marie Acevedo MD      Objective   /76   Ht 172.7 cm (68\")   Wt 114 kg (251 lb 9.6 oz)   BMI 38.26 kg/m²     Physical Exam  Vitals and nursing note reviewed. Exam conducted with a chaperone present.   Constitutional:       General: She is awake.   HENT:      Head: Normocephalic and atraumatic.   Neck:      Thyroid: No thyroid mass, thyromegaly or thyroid tenderness.   Cardiovascular:      Rate and Rhythm: Normal rate.      Heart sounds: No murmur heard.    No friction rub. No gallop.   Pulmonary:      Effort: Pulmonary effort is normal.      Breath sounds: Normal breath sounds. No stridor. No wheezing, rhonchi or rales.   Chest:      Chest wall: No mass or tenderness.   Breasts:      Right: Normal. No bleeding, inverted nipple, mass, nipple discharge, skin change, tenderness, axillary adenopathy or supraclavicular adenopathy.      Left: Normal. No bleeding, inverted nipple, mass, nipple discharge, skin change, tenderness, axillary adenopathy or supraclavicular adenopathy.       Abdominal:      Palpations: Abdomen is soft.      Tenderness: There is no guarding or rebound.      Hernia: There is no hernia in the left inguinal area or right inguinal area.   Genitourinary:     General: Normal vulva.      Pubic Area: No rash.       Labia:         Right: No rash, tenderness, lesion or injury.         Left: No rash, tenderness, lesion or injury.       Urethra: No prolapse, urethral swelling or urethral lesion.      Vagina: No foreign body. No vaginal discharge, erythema, " tenderness, bleeding or lesions.      Cervix: No cervical motion tenderness, discharge, friability, lesion, erythema or cervical bleeding.      Uterus: Normal. Not deviated, not enlarged, not fixed and not tender.       Adnexa: Right adnexa normal and left adnexa normal.        Right: No mass, tenderness or fullness.          Left: No mass, tenderness or fullness.        Rectum: No external hemorrhoid.   Musculoskeletal:      Cervical back: Normal range of motion.   Lymphadenopathy:      Upper Body:      Right upper body: No supraclavicular or axillary adenopathy.      Left upper body: No supraclavicular or axillary adenopathy.   Skin:     General: Skin is warm.   Neurological:      Mental Status: She is alert and oriented to person, place, and time.   Psychiatric:         Mood and Affect: Mood and affect normal.         Speech: Speech normal.            Assessment and Plan    Problem List Items Addressed This Visit    None     Visit Diagnoses     Women's annual routine gynecological examination    -  Primary    Relevant Orders    LIQUID-BASED PAP SMEAR, [facility] ([facility])    Breast cancer screening by mammogram        Colon cancer screening        Relevant Orders    Ambulatory Referral For Screening Colonoscopy          1. GYN annual well woman exam.   2. Pap guidelines reviewed.  3. Reviewed monthly self breast exams.  Instructed to call with lumps, pain, or breast discharge.    4. Recommended use of Vitamin D replacement and getting adequate calcium in her diet. (1500mg)  5. Reviewed exercise as a preventative health measures.   6. RTC in 1 year or PRN with problems.  7. Return in about 1 year (around 9/15/2023) for Annual physical with DEXA scan.   8. Discussed checking FSH when she develops VMS    Ann-Marie Acevedo MD  09/15/2022

## 2022-09-16 LAB — REF LAB TEST METHOD: NORMAL

## 2022-10-14 ENCOUNTER — APPOINTMENT (OUTPATIENT)
Dept: MAMMOGRAPHY | Facility: HOSPITAL | Age: 52
End: 2022-10-14

## 2022-11-01 ENCOUNTER — HOSPITAL ENCOUNTER (OUTPATIENT)
Dept: MAMMOGRAPHY | Facility: HOSPITAL | Age: 52
Discharge: HOME | End: 2022-11-01
Admitting: OBSTETRICS & GYNECOLOGY

## 2022-11-01 DIAGNOSIS — Z12.31 SCREENING MAMMOGRAM FOR BREAST CANCER: ICD-10-CM

## 2022-11-01 PROCEDURE — 77067 SCR MAMMO BI INCL CAD: CPT | Performed by: RADIOLOGY

## 2022-11-01 PROCEDURE — 77063 BREAST TOMOSYNTHESIS BI: CPT | Performed by: RADIOLOGY

## 2022-11-01 PROCEDURE — 77063 BREAST TOMOSYNTHESIS BI: CPT

## 2022-11-01 PROCEDURE — 77067 SCR MAMMO BI INCL CAD: CPT

## 2022-11-28 ENCOUNTER — CLINICAL SUPPORT (OUTPATIENT)
Dept: OBSTETRICS AND GYNECOLOGY | Facility: CLINIC | Age: 52
End: 2022-11-28
Payer: COMMERCIAL

## 2022-11-28 VITALS — BODY MASS INDEX: 38.26 KG/M2 | WEIGHT: 251.6 LBS

## 2022-11-28 DIAGNOSIS — Z30.42 SURVEILLANCE FOR MEDROXYPROGESTERONE CONTRACEPTION: Primary | ICD-10-CM

## 2022-11-28 RX ORDER — MEDROXYPROGESTERONE ACETATE 150 MG/ML
150 INJECTION, SUSPENSION INTRAMUSCULAR ONCE
Status: COMPLETED | OUTPATIENT
Start: 2022-11-28 | End: 2023-02-21

## 2023-01-04 NOTE — PROGRESS NOTES
New Patient Consult      Date: 2023   Patient Name: Marnie An  MRN: [id number]  : 1970     Referring Physician: Ann-Marie Acevedo MD    Chief Complaint   Patient presents with   • Colon Cancer Screening       History of Present Illness: Marnie An is a 52 y.o. female who is here today to establish care with Gastroenterology for to schedule [place] colonoscopy.    The deny any abdominal pain, change in bowel habit, hematochezia or melena.  Weight is stable. Pt denies nausea vomiting or odynophagia.  She has a chronic history of dysphagia.  She had EGD done as per patient in 2017 at [facility] and was been told that she had a stricture that was dilated since then her dysphagia improved.  Still gets intermittent dysphagia to solids. She has significant reflux symptoms with heartburn almost every day but not taking any regular medications.  He takes only over-the-counter antacids as needed.   There is no history of anemia. No prior history of colonoscopy. No family history of colon cancer or any GI malignancy. No history of any abdominal surgery. Denies alcohol abuse or cigarette smoking.     Subjective      Past Medical History:   Past Medical History:   Diagnosis Date   • Esophageal stricture     [facility]   • GERD (gastroesophageal reflux disease) 14   • Hematuria 2021    CT SCAN and Cystoscopy with Dr. Fajardo   • Hyperlipidemia     pt and family   • Snores        Past Surgical History:   Past Surgical History:   Procedure Laterality Date   • BREAST BIOPSY Right 2012    Ultrasound guided   • CYSTOSCOPY     • ENDOSCOPY     • KNEE ARTHROSCOPY  2014    right torn meniscus    • REPLACEMENT TOTAL KNEE Left 2022   • UPPER GASTROINTESTINAL ENDOSCOPY  17   • VAGINAL DELIVERY N/A        Family History:   Family History   Problem Relation Age of Onset   • Heart disease Father    • Hyperlipidemia Father    • Colon polyps Father    •  Hearing loss Mother    • Heart disease Mother    • Hyperlipidemia Mother    • Hypertension Mother    • Stroke Mother    • Breast cancer Paternal Grandmother         80's   • Ovarian cancer Neg Hx    • Uterine cancer Neg Hx    • Colon cancer Neg Hx        Social History:   Social History     Socioeconomic History   • Marital status:    Tobacco Use   • Smoking status: Never   • Smokeless tobacco: Never   Vaping Use   • Vaping Use: Never used   Substance and Sexual Activity   • Alcohol use: Yes     Alcohol/week: 1.0 standard drink     Types: 1 Drinks containing 0.5 oz of alcohol per week     Comment: social   • Drug use: Never   • Sexual activity: Defer     Partners: Male     Birth control/protection: Injection, Depo-provera         Current Outpatient Medications:   •  Acetaminophen (Tylenol) 325 MG capsule, Tylenol 325 mg capsule  Take by oral route., Disp: , Rfl:   •  ascorbic acid (VITAMIN C) 500 MG tablet, Every 12 (Twelve) Hours., Disp: , Rfl:   •  aspirin 81 MG EC tablet, Take 81 mg by mouth Every 12 (Twelve) Hours., Disp: , Rfl:   •  halobetasol (ULTRAVATE) 0.05 % ointment, APPLY TWO TIMES A DAY    TO AFFECTED AREA AS NEEDED FOR 14 DAYS, Disp: , Rfl:   •  rosuvastatin (CRESTOR) 10 MG tablet, Take 10 mg by mouth Daily. 200001, Disp: , Rfl:   •  pantoprazole (Protonix) 20 MG EC tablet, Take 1 tablet by mouth Daily for 30 days., Disp: 30 tablet, Rfl: 5  •  sodium-potassium-magnesium sulfates (Suprep Bowel Prep Kit) 17.5-3.13-1.6 GM/177ML solution oral solution, Take 2 bottles by mouth 1 (One) Time for 1 day. Please see prep instructions from office., Disp: 354 mL, Rfl: 0    Current Facility-Administered Medications:   •  medroxyPROGESTERone (DEPO-PROVERA) injection 150 mg, 150 mg, Intramuscular, Q3 Months, Ann-Marie Acevedo MD, 150 mg at 09/06/22 0912  •  medroxyPROGESTERone (DEPO-PROVERA) injection 150 mg, 150 mg, Intramuscular, Once, Ann-Marie Acevedo MD    Allergies   Allergen Reactions   • E.E.S.  [Erythromycin] GI Intolerance     Abdominal pain       Review of Systems:   Review of Systems   Constitutional: Negative for appetite change, fatigue, fever and unexpected weight loss.   HENT: Positive for trouble swallowing.    Gastrointestinal: Positive for GERD and indigestion. Negative for abdominal distention, abdominal pain, anal bleeding, blood in stool, constipation, diarrhea, nausea, rectal pain and vomiting.       The following portions of the patient's history were reviewed and updated as appropriate: allergies, current medications, past family history, past medical history, past social history, past surgical history and problem list.    Objective     Physical Exam:  Vital Signs:   Vitals:    01/05/23 1445   BP: 146/92   Pulse: 82   Temp: 98.6 °F (37 °C)   TempSrc: Infrared   Weight: 113 kg (250 lb)   Height: 172.7 cm (68\")       Physical Exam  Constitutional:       Appearance: She is obese.   HENT:      Head: Normocephalic and atraumatic.   Eyes:      Conjunctiva/sclera: Conjunctivae normal.   Abdominal:      General: Abdomen is flat. There is no distension.      Palpations: There is no mass.      Tenderness: There is no abdominal tenderness. There is no guarding or rebound.      Hernia: No hernia is present.   Musculoskeletal:      Cervical back: Normal range of motion and neck supple.   Neurological:      Mental Status: She is alert.           Results Review:   I have reviewed the patient's new clinical and imaging results and agree with the interpretation.     No visits with results within 90 Day(s) from this visit.   Latest known visit with results is:   Office Visit on 09/15/2022   Component Date Value Ref Range Status   • Reference Lab Report 09/15/2022    Final                    Value:[facility]  [address] [place] [address]  Phone: [phone] or [phone]  Fax: [phone]  Niko Singh M.D., Medical Director    PATIENT NAME                                      LABORATORY NO[address]  [id number]   HOPE DOWNEY.                          [id number]  [id number]                                 AGE                    SEX   SSN              CLIENT REF #  [id number]                                 52        1970      F     [id number]      [id number]    [id number]                 REQUESTING M.D.           ATTENDING [place] [address]                        CALEB DO  DATE COLLECTED            DATE RECEIVED          DATE REPORTED  09/15/2022                09/15/2022             2022    ThinPrep Pap with [facility]    DIAGNOSIS:  Negative for intraepithelial lesion or malignancy    Multiple factors can influence accuracy of Pap tests; therefore, screening at  regular                           intervals is necessary for early cancer detection.    COMMENT:     Benign cellular changes associated with atrophy are present.    SPECIMEN ADEQUACY:  SATISFACTORY FOR EVALUATION  Transformation zone is present.  SOURCE OF SPECIMEN:       CERVICAL  SLIDES:  1  CLINICAL HISTORY:  Women's annual routine gynecological examination      CYTOTECHNOLOGIST:             DAMARI BALDERAS (ASCP)    CPT CODES:  32306        Mammo Screening Digital Tomosynthesis Bilateral With CAD    Result Date: 2022  Negative bilateral mammogram.  RECOMMENDATION:  Continue annual screening mammography.  BI-RADS CATEGORY 1, NEGATIVE.   CAD was utilized.  The standard false-negative rate of mammography is between 10% and 25%. Complex patterns or increased breast density will markedly elevate the false-negative rate of mammography.    A letter, in lay terminology, with the results of this exam will be mailed to the patient.   This report was finalized on 2022 11:29 AM by Dr. Kylie Duong MD.        Assessment / Plan      Assessment & Plan:  1. Encounter for screening for malignant neoplasm of colon  No family history of any colon cancer.  No prior  colonoscopy.  She is overdue for colonoscopy will schedule the same    The indications, technique, alternatives and potential risk and complications were discussed with the patient including but not limited to bleeding, bowel perforations, missing lesions and anesthetic complications. The patient understands and wishes to proceed with the procedure and has given their verbal consent. Written patient education information was given to the patient.   The patient will call if they have further questions before procedure.     - Case Request; Standing  - Implement Anesthesia Orders Day of Procedure; Standing  - Obtain Informed Consent; Standing  - Verify NPO; Standing  - Verify Bowel Prep Was Successful; Standing  - Oxygen Therapy- Nasal Cannula; 2 LPM; Titrate for SPO2: equal to or greater than, 90%; Standing  - sodium chloride 0.9 % infusion  - Case Request    2. Gastroesophageal reflux disease with esophagitis without hemorrhage  3. Esophageal dysphagia  4. History of esophageal stricture  Patient has a chronic history of gastroesophageal reflux disease.  She also has a prior history of as per patient stricture diagnosed for which she had a dilatation done in 2017.  Her dysphagia improved after the dilatation however she still has intermittent dysphagia to solids.  Unclear whether this is was a peptic stricture or EOE.  She has a significant reflux symptoms almost daily.    Reflux diet discussed with the patient.  We will start her back on PPI now  Depending on the response we will consider EGD with possible dilatation in the near future.    5.  Obesity with a BMI 38  As per patient she had a CMP done last year which was normal.  Her lab work to interpret.  Discussion regarding her left changes and dietary changes.  Patient is aware of process of losing weight to prevent the development of fatty liver disease.            Follow Up:   Return for Follow Up after procedure.    Patito Arias MD  Gastroenterology  [place]  1/5/2023  19:54 EST    Please note that portions of this note may have been completed with a voice recognition program.

## 2023-01-05 ENCOUNTER — OFFICE VISIT (OUTPATIENT)
Dept: GASTROENTEROLOGY | Facility: CLINIC | Age: 53
End: 2023-01-05
Payer: COMMERCIAL

## 2023-01-05 VITALS
WEIGHT: 250 LBS | TEMPERATURE: 98.6 F | HEART RATE: 82 BPM | SYSTOLIC BLOOD PRESSURE: 146 MMHG | BODY MASS INDEX: 37.89 KG/M2 | DIASTOLIC BLOOD PRESSURE: 92 MMHG | HEIGHT: 68 IN

## 2023-01-05 DIAGNOSIS — K21.00 GASTROESOPHAGEAL REFLUX DISEASE WITH ESOPHAGITIS WITHOUT HEMORRHAGE: ICD-10-CM

## 2023-01-05 DIAGNOSIS — R13.19 ESOPHAGEAL DYSPHAGIA: ICD-10-CM

## 2023-01-05 DIAGNOSIS — Z87.19 HISTORY OF ESOPHAGEAL STRICTURE: ICD-10-CM

## 2023-01-05 DIAGNOSIS — Z12.11 ENCOUNTER FOR SCREENING FOR MALIGNANT NEOPLASM OF COLON: Primary | ICD-10-CM

## 2023-01-05 PROCEDURE — 99204 OFFICE O/P NEW MOD 45 MIN: CPT | Performed by: INTERNAL MEDICINE

## 2023-01-05 RX ORDER — PANTOPRAZOLE SODIUM 20 MG/1
20 TABLET, DELAYED RELEASE ORAL DAILY
Qty: 30 TABLET | Refills: 5 | Status: SHIPPED | OUTPATIENT
Start: 2023-01-05 | End: 2023-02-04

## 2023-01-05 RX ORDER — ASCORBIC ACID 500 MG
TABLET ORAL EVERY 12 HOURS SCHEDULED
COMMUNITY

## 2023-01-05 RX ORDER — ASPIRIN 81 MG/1
81 TABLET ORAL EVERY 12 HOURS SCHEDULED
COMMUNITY
End: 2023-03-10 | Stop reason: HOSPADM

## 2023-01-05 RX ORDER — SODIUM, POTASSIUM,MAG SULFATES 17.5-3.13G
2 SOLUTION, RECONSTITUTED, ORAL ORAL ONCE
Qty: 354 ML | Refills: 0 | Status: SHIPPED | OUTPATIENT
Start: 2023-01-05 | End: 2023-01-06

## 2023-01-05 RX ORDER — SODIUM CHLORIDE 9 MG/ML
70 INJECTION, SOLUTION INTRAVENOUS CONTINUOUS PRN
Status: CANCELLED | OUTPATIENT
Start: 2023-01-05

## 2023-02-21 ENCOUNTER — CLINICAL SUPPORT (OUTPATIENT)
Dept: OBSTETRICS AND GYNECOLOGY | Facility: CLINIC | Age: 53
End: 2023-02-21
Payer: COMMERCIAL

## 2023-02-21 VITALS — WEIGHT: 246 LBS | BODY MASS INDEX: 37.28 KG/M2 | HEIGHT: 68 IN

## 2023-02-21 DIAGNOSIS — Z30.42 ENCOUNTER FOR SURVEILLANCE OF INJECTABLE CONTRACEPTIVE: ICD-10-CM

## 2023-02-21 PROCEDURE — 96372 THER/PROPH/DIAG INJ SC/IM: CPT | Performed by: OBSTETRICS & GYNECOLOGY

## 2023-02-21 RX ADMIN — MEDROXYPROGESTERONE ACETATE 150 MG: 150 INJECTION, SUSPENSION INTRAMUSCULAR at 11:06

## 2023-02-21 NOTE — PROGRESS NOTES
DepoProvera Contraception Note  Marnie An is a 52 y.o. female here for her DepoProvera injection. Her LMP was N/A. She weighs 246lbs and she is 6feet 8inches tall. Marnie An is sexually active. The patient's last pap smear was 9/15/22. Her last injection was 11/28/22. Her past reactions to this injection include:none. It has been less than 12 weeks since her last injection. Her last annual exam was 9/15/22.    Patient Questions  Have you ever passed out from an injection? No     Have you ever had a reaction to an injection? No    Are you allergic to any medications? No    Have you been sick in the last month? No    Injection Details  Noted in the MAR.    Next Injection  Her next injection is 5/16/23.    Electronically Signed By:  Zully Callaway RN  02/21/2023

## 2023-02-22 PROBLEM — Z12.11 ENCOUNTER FOR SCREENING FOR MALIGNANT NEOPLASM OF COLON: Status: ACTIVE | Noted: 2023-02-22

## 2023-03-08 NOTE — PRE-PROCEDURE INSTRUCTIONS
PAT phone history completed with pt for upcoming procedure on 3/10/23.    PAT PASS GIVEN/REVIEWED WITH PT.  VERBALIZED UNDERSTANDING OF THE FOLLOWING:  DO NOT EAT, DRINK, SMOKE, USE SMOKELESS TOBACCO OR CHEW GUM AFTER MIDNIGHT THE NIGHT BEFORE SURGERY.  THIS ALSO INCLUDES HARD CANDIES AND MINTS.    DO NOT SHAVE THE AREA TO BE OPERATED ON AT LEAST 48 HOURS PRIOR TO THE PROCEDURE.  DO NOT WEAR MAKE UP OR NAIL POLISH.  DO NOT LEAVE IN ANY PIERCING OR WEAR JEWELRY THE DAY OF SURGERY.      DO NOT USE ADHESIVES IF YOU WEAR DENTURES.    DO NOT WEAR EYE CONTACTS; BRING IN YOUR GLASSES.    ONLY TAKE MEDICATION THE MORNING OF YOUR PROCEDURE IF INSTRUCTED BY YOUR SURGEON WITH ENOUGH WATER TO SWALLOW THE MEDICATION.  IF YOUR SURGEON DID NOT SPECIFY WHICH MEDICATIONS TO TAKE, YOU WILL NEED TO CALL THEIR OFFICE FOR FURTHER INSTRUCTIONS AND DO AS THEY INSTRUCT.    LEAVE ANYTHING YOU CONSIDER VALUABLE AT HOME.    YOU WILL NEED TO ARRANGE FOR SOMEONE TO DRIVE YOU HOME AFTER SURGERY.  IT IS RECOMMENDED THAT YOU DO NOT DRIVE, WORK, DRINK ALCOHOL OR MAKE MAJOR DECISIONS FOR AT LEAST 24 HOURS AFTER YOUR PROCEDURE IS COMPLETE.      THE DAY OF YOUR PROCEDURE, BRING IN THE FOLLOWING IF APPLICABLE:   PICTURE ID AND INSURANCE/MEDICARE OR MEDICAID CARDS/ANY CO-PAY THAT MAY BE DUE   COPY OF ADVANCED DIRECTIVE/LIVING WILL/POWER OR    CPAP/BIPAP/INHALERS   SKIN PREP SHEET   YOUR PREADMISSION TESTING PASS (IF NOT A PHONE HISTORY)

## 2023-03-10 ENCOUNTER — HOSPITAL ENCOUNTER (OUTPATIENT)
Facility: HOSPITAL | Age: 53
Setting detail: HOSPITAL OUTPATIENT SURGERY
Discharge: HOME | End: 2023-03-10
Attending: INTERNAL MEDICINE | Admitting: INTERNAL MEDICINE
Payer: COMMERCIAL

## 2023-03-10 ENCOUNTER — ANESTHESIA EVENT (OUTPATIENT)
Dept: GASTROENTEROLOGY | Facility: HOSPITAL | Age: 53
End: 2023-03-10
Payer: COMMERCIAL

## 2023-03-10 ENCOUNTER — ANESTHESIA (OUTPATIENT)
Dept: GASTROENTEROLOGY | Facility: HOSPITAL | Age: 53
End: 2023-03-10
Payer: COMMERCIAL

## 2023-03-10 VITALS
HEIGHT: 68 IN | RESPIRATION RATE: 18 BRPM | HEART RATE: 80 BPM | OXYGEN SATURATION: 99 % | BODY MASS INDEX: 37.89 KG/M2 | SYSTOLIC BLOOD PRESSURE: 114 MMHG | WEIGHT: 250 LBS | TEMPERATURE: 98.2 F | DIASTOLIC BLOOD PRESSURE: 53 MMHG

## 2023-03-10 DIAGNOSIS — Z12.11 ENCOUNTER FOR SCREENING FOR MALIGNANT NEOPLASM OF COLON: ICD-10-CM

## 2023-03-10 LAB
B-HCG UR QL: NEGATIVE
EXPIRATION DATE: NORMAL
INTERNAL NEGATIVE CONTROL: NEGATIVE
INTERNAL POSITIVE CONTROL: NORMAL
Lab: NORMAL

## 2023-03-10 PROCEDURE — 81025 URINE PREGNANCY TEST: CPT | Performed by: INTERNAL MEDICINE

## 2023-03-10 PROCEDURE — 25010000002 PROPOFOL 200 MG/20ML EMULSION: Performed by: NURSE ANESTHETIST, CERTIFIED REGISTERED

## 2023-03-10 PROCEDURE — 88305 TISSUE EXAM BY PATHOLOGIST: CPT

## 2023-03-10 PROCEDURE — 45385 COLONOSCOPY W/LESION REMOVAL: CPT | Performed by: INTERNAL MEDICINE

## 2023-03-10 RX ORDER — SIMETHICONE 20 MG/.3ML
EMULSION ORAL AS NEEDED
Status: DISCONTINUED | OUTPATIENT
Start: 2023-03-10 | End: 2023-03-10 | Stop reason: HOSPADM

## 2023-03-10 RX ORDER — LIDOCAINE HYDROCHLORIDE 20 MG/ML
INJECTION, SOLUTION INTRAVENOUS AS NEEDED
Status: DISCONTINUED | OUTPATIENT
Start: 2023-03-10 | End: 2023-03-10 | Stop reason: SURG

## 2023-03-10 RX ORDER — SODIUM CHLORIDE 9 MG/ML
70 INJECTION, SOLUTION INTRAVENOUS CONTINUOUS PRN
Status: DISCONTINUED | OUTPATIENT
Start: 2023-03-10 | End: 2023-03-10 | Stop reason: HOSPADM

## 2023-03-10 RX ORDER — PROPOFOL 10 MG/ML
INJECTION, EMULSION INTRAVENOUS AS NEEDED
Status: DISCONTINUED | OUTPATIENT
Start: 2023-03-10 | End: 2023-03-10 | Stop reason: SURG

## 2023-03-10 RX ADMIN — SODIUM CHLORIDE 70 ML/HR: 9 INJECTION, SOLUTION INTRAVENOUS at 12:05

## 2023-03-10 RX ADMIN — LIDOCAINE HYDROCHLORIDE 60 MG: 20 INJECTION, SOLUTION INTRAVENOUS at 13:24

## 2023-03-10 RX ADMIN — PROPOFOL 200 MG: 10 INJECTION, EMULSION INTRAVENOUS at 13:24

## 2023-03-10 NOTE — DISCHARGE INSTRUCTIONS
- Discharge patient to home (ambulatory).   - High fiber diet.   - Continue present medications.   - Await pathology results.   - Repeat colonoscopy in 7-10 years for surveillance.   - Return to GI office in 8 weeks.    To assist you in voiding:  Drink plenty of fluids  Listen to running water while attempting to void.    If you are unable to urinate and you have an uncomfortable urge to void or it has been   6 hours since you were discharged, return to the Emergency Room Please follow all post op instructions and follow up appointment time from your physician's office included in your discharge packet.  .  Rest today  No pushing,pulling,tugging,heavy lifting, or strenuous activity   No major decision making,driving,or drinking alcoholic beverages for 24 hours due to the sedation you received  Always use good hand hygiene/washing technique  No driving on pain medication.

## 2023-03-10 NOTE — ANESTHESIA POSTPROCEDURE EVALUATION
Patient: Marnie An    Procedure Summary     Date: 03/10/23 Room / Location: [facility] / [facility]    Anesthesia Start: 1323 Anesthesia Stop: 1355    Procedure: COLONOSCOPY WITH POLYPECTOMY (Anus) Diagnosis:       Encounter for screening for malignant neoplasm of colon      (Encounter for screening for malignant neoplasm of colon [Z12.11])    Surgeons: Patito Arias MD Provider: Gaurav Gonzales CRNA    Anesthesia Type: MAC ASA Status: 2          Anesthesia Type: MAC    Vitals  Vitals Value Taken Time   /72 03/10/23 1358   Temp 98.2 °F (36.8 °C) 03/10/23 1358   Pulse 95 03/10/23 1358   Resp 16 03/10/23 1358   SpO2 98 % 03/10/23 1358           Post Anesthesia Care and Evaluation    Patient location during evaluation: bedside  Patient participation: complete - patient participated  Level of consciousness: awake  Pain score: 0  Pain management: adequate    Airway patency: patent  Anesthetic complications: No anesthetic complications  PONV Status: controlled  Cardiovascular status: acceptable and stable  Respiratory status: acceptable and room air  Hydration status: acceptable    Comments: See nursing documentation for post op vital signs

## 2023-03-10 NOTE — ANESTHESIA PREPROCEDURE EVALUATION
Anesthesia Evaluation     Patient summary reviewed and Nursing notes reviewed   NPO Solid Status: > 8 hours  NPO Liquid Status: > 8 hours           Airway   Mallampati: II  TM distance: >3 FB  Neck ROM: full  Possible difficult intubation  Dental - normal exam     Pulmonary - normal exam   Cardiovascular - normal exam    (+) hyperlipidemia,       Neuro/Psych  GI/Hepatic/Renal/Endo    (+)  GERD well controlled,      Musculoskeletal     Abdominal  - normal exam   Substance History      OB/GYN          Other                        Anesthesia Plan    ASA 2     MAC     intravenous induction     Anesthetic plan, risks, benefits, and alternatives have been provided, discussed and informed consent has been obtained with: patient.  Pre-procedure education provided  Plan discussed with CRNA.        CODE STATUS:

## 2023-03-10 NOTE — H&P
[facility]  HISTORY AND PHYSICAL    Patient Name: Marnie An  : 1970  MRN: [id number]    Chief Complaint:   For screening colonoscopy    History Of Presenting Illness:    Average risk screening colon    Past Medical History:   Diagnosis Date   • Esophageal stricture     [place], [place]   • GERD (gastroesophageal reflux disease) 14   • Hematuria 2021    CT SCAN and Cystoscopy with Dr. Fajardo   • Hyperlipidemia     pt and family   • Snores        Past Surgical History:   Procedure Laterality Date   • BREAST BIOPSY Right 2012    Ultrasound guided   • CYSTOSCOPY     • ENDOSCOPY     • KNEE ARTHROSCOPY  2014    right torn meniscus    • REPLACEMENT TOTAL KNEE Left 2022   • UPPER GASTROINTESTINAL ENDOSCOPY  17   • VAGINAL DELIVERY N/A        Social History     Socioeconomic History   • Marital status:    Tobacco Use   • Smoking status: Never   • Smokeless tobacco: Never   Vaping Use   • Vaping Use: Never used   Substance and Sexual Activity   • Alcohol use: Yes     Alcohol/week: 1.0 standard drink     Types: 1 Drinks containing 0.5 oz of alcohol per week     Comment: social   • Drug use: Never   • Sexual activity: Defer     Partners: Male     Birth control/protection: Injection, Depo-provera       Family History   Problem Relation Age of Onset   • Heart disease Father    • Hyperlipidemia Father    • Colon polyps Father    • Hearing loss Mother    • Heart disease Mother    • Hyperlipidemia Mother    • Hypertension Mother    • Stroke Mother    • Breast cancer Paternal Grandmother         80's   • Ovarian cancer Neg Hx    • Uterine cancer Neg Hx    • Colon cancer Neg Hx        Prior to Admission Medications:  Facility-Administered Medications Prior to Admission   Medication Dose Route Frequency Provider Last Rate Last Admin   • medroxyPROGESTERone (DEPO-PROVERA) injection 150 mg  150 mg Intramuscular Q3 Months Ann-Marie Acevedo MD   150 mg at 22  0912     Medications Prior to Admission   Medication Sig Dispense Refill Last Dose   • Acetaminophen (Tylenol) 325 MG capsule Tylenol 325 mg capsule   Take by oral route.   Past Week   • ascorbic acid (VITAMIN C) 500 MG tablet Every 12 (Twelve) Hours.   3/9/2023 at 0700   • halobetasol (ULTRAVATE) 0.05 % ointment APPLY TWO TIMES A DAY    TO AFFECTED AREA AS NEEDED FOR 14 DAYS   3/9/2023 at 0700   • rosuvastatin (CRESTOR) 10 MG tablet Take 1 tablet by mouth Daily. [id number]   3/9/2023 at 1900   • aspirin 81 MG EC tablet Take 81 mg by mouth Every 12 (Twelve) Hours.          Allergies:  Allergies   Allergen Reactions   • E.E.S. [Erythromycin] GI Intolerance     Abdominal pain        Vitals: Temp:  [97.1 °F (36.2 °C)] 97.1 °F (36.2 °C)  Heart Rate:  [93] 93  Resp:  [17] 17    Review Of Systems:  Constitutional:  Negative for chills, fever, and unexpected weight change.  Respiratory:  Negative for cough, chest tightness, shortness of breath, and wheezing.  Cardiovascular:  Negative for chest pain, palpitations, and leg swelling.  Gastrointestinal:  Negative for abdominal distention, abdominal pain, nausea, vomiting.  Neurological:  Negative for weakness, numbness, and headaches.     Physical Exam:    General Appearance:  Alert, cooperative, in no acute distress.   Lungs:   Clear to auscultation, respirations regular, even and                 unlabored.   Heart:  Regular rhythm and normal rate.   Abdomen:   Normal bowel sounds, no masses, no organomegaly. Soft, nontender, nondistended   Neurologic: Alert and oriented x 3. Moves all four limbs equally       Assessment & Plan     Assessment:  Principal Problem:    Encounter for screening for malignant neoplasm of colon      Plan: COLONOSCOPY (N/A)     Patito Arias MD  3/10/2023

## 2023-03-15 LAB — REF LAB TEST METHOD: NORMAL

## 2023-05-15 ENCOUNTER — CLINICAL SUPPORT (OUTPATIENT)
Dept: OBSTETRICS AND GYNECOLOGY | Facility: CLINIC | Age: 53
End: 2023-05-15
Payer: COMMERCIAL

## 2023-05-15 VITALS
WEIGHT: 249.4 LBS | HEIGHT: 68 IN | BODY MASS INDEX: 37.8 KG/M2 | DIASTOLIC BLOOD PRESSURE: 74 MMHG | SYSTOLIC BLOOD PRESSURE: 118 MMHG

## 2023-05-15 DIAGNOSIS — Z30.42 ENCOUNTER FOR SURVEILLANCE OF INJECTABLE CONTRACEPTIVE: Primary | ICD-10-CM

## 2023-05-15 RX ORDER — MEDROXYPROGESTERONE ACETATE 150 MG/ML
150 INJECTION, SUSPENSION INTRAMUSCULAR ONCE
Status: COMPLETED | OUTPATIENT
Start: 2023-05-15 | End: 2023-05-15

## 2023-05-15 RX ADMIN — MEDROXYPROGESTERONE ACETATE 150 MG: 150 INJECTION, SUSPENSION INTRAMUSCULAR at 16:53

## 2023-05-15 NOTE — PROGRESS NOTES
DepoProvera Contraception Note  Marnie An is a 52 y.o. female here for her DepoProvera injection. Her LMP was unknown; she does not have them, secondary to the DepoProvera. She weighs 249.4 lbs and she is 5 feet 8 inches tall. Marnie An is sexually active. The patient's last pap smear was 9/5/2022. Her last injection was 2/21/2023. Her past reactions to this injection include: nothing. It has been 12 weeks since her last injection. Her last annual exam was 9/5/2022.    Patient Questions  Have you ever passed out from an injection? No     Have you ever had a reaction to an injection? No    Are you allergic to any medications? Yes    Have you been sick in the last month? No    Injection Details  Noted in the MAR.    Next Injection  Her next injection is 8/7/2023.    NDC: 2463-9683-09  EXP: 9/2023  LOT: [id number]    Electronically Signed By:  Barb Reardon RN  05/15/2023

## 2023-06-07 ENCOUNTER — OFFICE VISIT (OUTPATIENT)
Dept: GASTROENTEROLOGY | Facility: CLINIC | Age: 53
End: 2023-06-07
Payer: COMMERCIAL

## 2023-06-07 VITALS
SYSTOLIC BLOOD PRESSURE: 122 MMHG | TEMPERATURE: 97.7 F | WEIGHT: 253 LBS | BODY MASS INDEX: 38.47 KG/M2 | DIASTOLIC BLOOD PRESSURE: 60 MMHG

## 2023-06-07 DIAGNOSIS — R13.19 ESOPHAGEAL DYSPHAGIA: ICD-10-CM

## 2023-06-07 DIAGNOSIS — Z87.19 HISTORY OF ESOPHAGEAL STRICTURE: ICD-10-CM

## 2023-06-07 DIAGNOSIS — Z12.11 ENCOUNTER FOR SCREENING FOR MALIGNANT NEOPLASM OF COLON: ICD-10-CM

## 2023-06-07 DIAGNOSIS — K21.00 GASTROESOPHAGEAL REFLUX DISEASE WITH ESOPHAGITIS WITHOUT HEMORRHAGE: Primary | Chronic | ICD-10-CM

## 2023-06-07 DIAGNOSIS — E66.01 CLASS 2 SEVERE OBESITY DUE TO EXCESS CALORIES WITH SERIOUS COMORBIDITY AND BODY MASS INDEX (BMI) OF 38.0 TO 38.9 IN ADULT: Chronic | ICD-10-CM

## 2023-06-07 PROBLEM — E66.812 CLASS 2 SEVERE OBESITY DUE TO EXCESS CALORIES WITH SERIOUS COMORBIDITY AND BODY MASS INDEX (BMI) OF 38.0 TO 38.9 IN ADULT: Status: ACTIVE | Noted: 2023-06-07

## 2023-06-07 NOTE — LETTER
2023       No Recipients    Patient: Marnie An   YOB: 1970   Date of Visit: 2023       Dear Ann-Marie Acevedo MD    Marnie An was in my office today. Below is a copy of my note.    If you have questions, please do not hesitate to call me. I look forward to following Marnie along with you.         Sincerely,        BOBBY Medrano        CC:   No Recipients         Follow Up Note     Date: 2023   Patient Name: Marnie An  MRN: [id number]  : 1970     Primary Care Provider: Izabela Child MD     Chief Complaint   Patient presents with   • Follow-up   • Colon Polyps     History of present illness:   2023  Marnie An is a 52 y.o. female who is here today for follow up after colonoscopy. She did not have any problems after her colonoscopy. She has cut out her sparkling water and her reflux has significantly improved. She is no longer having reflux daily and doesn't have to take anything on a regular basis. Difficulty swallowing doing ok right now, not a significant problem at this time.    Interval History:  2023  The deny any abdominal pain, change in bowel habit, hematochezia or melena.  Weight is stable. Pt denies nausea vomiting or odynophagia.  She has a chronic history of dysphagia.  She had EGD done as per patient in 2017 at [facility] and was been told that she had a stricture that was dilated since then her dysphagia improved.  Still gets intermittent dysphagia to solids. She has significant reflux symptoms with heartburn almost every day but not taking any regular medications.  He takes only over-the-counter antacids as needed.   There is no history of anemia. No prior history of colonoscopy. No family history of colon cancer or any GI malignancy. No history of any abdominal surgery. Denies alcohol abuse or cigarette smoking.     Subjective      Past Medical History:   Diagnosis Date   • Esophageal  stricture     [place], [place]   • GERD (gastroesophageal reflux disease) 1/1/12   • Hematuria 11/09/2021    CT SCAN and Cystoscopy with Dr. Fajardo   • Hyperlipidemia     pt and family   • Snores      Past Surgical History:   Procedure Laterality Date   • BREAST BIOPSY Right 07/12/2012    Ultrasound guided   • COLONOSCOPY N/A 3/10/2023    Procedure: COLONOSCOPY WITH POLYPECTOMY;  Surgeon: Patito Arias MD;  Location: [facility] ENDOSCOPY;  Service: Gastroenterology;  Laterality: N/A;   • CYSTOSCOPY     • ENDOSCOPY     • KNEE ARTHROSCOPY  03/2014    right torn meniscus    • REPLACEMENT TOTAL KNEE Left 11/29/2022   • UPPER GASTROINTESTINAL ENDOSCOPY  1/1/17   • VAGINAL DELIVERY N/A 2006     Family History   Problem Relation Age of Onset   • Heart disease Father    • Hyperlipidemia Father    • Colon polyps Father    • Hearing loss Mother    • Heart disease Mother    • Hyperlipidemia Mother    • Hypertension Mother    • Stroke Mother    • Breast cancer Paternal Grandmother         80's   • Ovarian cancer Neg Hx    • Uterine cancer Neg Hx    • Colon cancer Neg Hx      Social History     Socioeconomic History   • Marital status:    Tobacco Use   • Smoking status: Never   • Smokeless tobacco: Never   Vaping Use   • Vaping Use: Never used   Substance and Sexual Activity   • Alcohol use: Yes     Alcohol/week: 1.0 standard drink     Types: 1 Drinks containing 0.5 oz of alcohol per week     Comment: social   • Drug use: Never   • Sexual activity: Yes     Partners: Male     Birth control/protection: Injection, Depo-provera       Current Outpatient Medications:   •  Acetaminophen (Tylenol) 325 MG capsule, Tylenol 325 mg capsule  Take by oral route., Disp: , Rfl:   •  halobetasol (ULTRAVATE) 0.05 % ointment, APPLY TWO TIMES A DAY    TO AFFECTED AREA AS NEEDED FOR 14 DAYS, Disp: , Rfl:   •  rosuvastatin (CRESTOR) 10 MG tablet, Take 1 tablet by mouth Daily. 200001, Disp: , Rfl:     Current Facility-Administered  Medications:   •  medroxyPROGESTERone (DEPO-PROVERA) injection 150 mg, 150 mg, Intramuscular, Q3 Months, Ann-Marie Acevedo MD, 150 mg at 09/06/22 0912    Allergies   Allergen Reactions   • E.E.S. [Erythromycin] GI Intolerance     Abdominal pain     The following portions of the patient's history were reviewed and updated as appropriate: allergies, current medications, past family history, past medical history, past social history, past surgical history and problem list.  Objective     Physical Exam  Vitals and nursing note reviewed.   Constitutional:       General: She is not in acute distress.     Appearance: Normal appearance. She is well-developed.   HENT:      Head: Normocephalic and atraumatic.      Mouth/Throat:      Mouth: Mucous membranes are not pale, not dry and not cyanotic.   Eyes:      General: Lids are normal.   Neck:      Trachea: Trachea normal.   Cardiovascular:      Rate and Rhythm: Normal rate.   Pulmonary:      Effort: Pulmonary effort is normal. No respiratory distress.      Breath sounds: Normal breath sounds.   Abdominal:      General: Bowel sounds are normal.      Palpations: Abdomen is soft. There is no mass.      Tenderness: There is no abdominal tenderness.      Hernia: No hernia is present.   Skin:     General: Skin is warm and dry.   Neurological:      Mental Status: She is alert and oriented to person, place, and time.   Psychiatric:         Mood and Affect: Mood normal.         Speech: Speech normal.         Behavior: Behavior normal. Behavior is cooperative.     Vitals:    06/07/23 1554   BP: 122/60   Temp: 97.7 °F (36.5 °C)   TempSrc: Infrared   Weight: 115 kg (253 lb)     Body mass index is 38.47 kg/m².     Results Review:   I reviewed the patient's new clinical results.    Admission on 03/10/2023, Discharged on 03/10/2023   Component Date Value Ref Range Status   • HCG, Urine, QL 03/10/2023 Negative  Negative Final   • Lot Number 03/10/2023 [id number]   Final   • Internal Positive  Control 03/10/2023 Passed  Positive, Passed Final   • Internal Negative Control 03/10/2023 Negative  Negative, Passed Final   • Expiration Date 03/10/2023 43,024   Final   • Reference Lab Report 03/10/2023    Final                    Value:[facility]  [address] [place] [address]  Phone: [phone] or [phone]  Fax: [phone]  Niko Singh M.D., Medical Director    PATIENT NAME                                     LABORATORY [facility]  [facility]                          [id number]  [id number]                                 AGE                    SEX   SSN              CLIENT REF #  [place] [id number]        1970      F     [id number]      [id number]    [address]                        REQUESTING M.D.           ATTENDING [facility].         COPY TO.   [address]                                [place] [address]                         CARLOS LOPEZ  DATE COLLECTED            DATE RECEIVED          DATE REPORTED  03/10/2023                2023             03/15/2023    DIAGNOSIS:  CECUM POLYP:  Benign mucosal fold with prominent lymphoid aggregate  Negative                           for adenomatous or hyperplastic changes in routine and multiple  deeper levels    [facility]    REVIEWED, DIAGNOSED AND ELECTRONICALLY  SIGNED BY:    Veronique Dickerson D.O.  CPT CODES:  14840        Colonoscopy dated 3/10/2023 per Dr. Arias  - One 2 mm polyp in the cecum, removed with a cold snare. Resected and retrieved. Clinically hyperplastic  - Diverticulosis in the cecum.  - Non-bleeding internal hemorrhoids.  - The examined portion of the ileum was normal.  CECUM POLYP:   Benign mucosal fold with prominent lymphoid aggregate   Negative for adenomatous or hyperplastic changes in routine and multiple   deeper levels    Assessment / Plan      1. Gastroesophageal reflux  disease with esophagitis without hemorrhage  2. Esophageal dysphagia  3. History of esophageal stricture  She has cut out sparkling water and her reflux has improved. She has not needed to take anything on a regular basis for her reflux since changing her diet. Difficulty swallowing doing ok right now, no significant problem. Per patient she had EGD with dilation in 2017 for stricture.   Anti-reflux measures.  May take over the counter antacids as needed.   EGD in the future if symptoms worsen.    4. Class 2 severe obesity due to excess calories with serious comorbidity and body mass index (BMI) of 38.0 to 38.9 in adult  BMI 38.47  No recent labs or imaging to interpret. No history of elevated liver enzymes or liver disease per patient.   Recommend low fat diet, exercise and weight reduction.     5. Encounter for screening for malignant neoplasm of colon  Colonoscopy with 1 polyp removed, biopsy with benign mucosal fold. No family history of colon cancer.  Colonoscopy for screening in 10 years, 2033.     Patient Instructions   Antireflux measures: Avoid fried, fatty foods, alcohol, chocolate, coffee, tea,  soft drinks, all carbonated beverages (including sparkling water), peppermint and spearmint, spicy foods, tomatoes and tomato based foods, onion based foods, and garlic.   Other antireflux measures include weight reduction if overweight, avoiding tight clothing around the abdomen, elevating the head of the bed 6 inches with blocks under the head board, and don't drink or eat before going to bed and avoid lying down immediately after meals.  May take over the counter antacids as needed.   Colonoscopy for screening in 10 years, 2033.  High fiber, low fat diet with liberal water intake.   Advised to exercise 30 minutes 4-5 days per week.   Advised to lose 25-30 pounds in the next 6-12 months.  Follow up: The patient will call back  BOBBY Medrano  6/7/2023    Please note that portions of this note were  completed with a voice recognition program.

## 2023-06-07 NOTE — PATIENT INSTRUCTIONS
Antireflux measures: Avoid fried, fatty foods, alcohol, chocolate, coffee, tea,  soft drinks, all carbonated beverages (including sparkling water), peppermint and spearmint, spicy foods, tomatoes and tomato based foods, onion based foods, and garlic.   Other antireflux measures include weight reduction if overweight, avoiding tight clothing around the abdomen, elevating the head of the bed 6 inches with blocks under the head board, and don't drink or eat before going to bed and avoid lying down immediately after meals.  May take over the counter antacids as needed.   Colonoscopy for screening in 10 years, 2033.  High fiber, low fat diet with liberal water intake.   Advised to exercise 30 minutes 4-5 days per week.   Advised to lose 25-30 pounds in the next 6-12 months.  Follow up: The patient will call back

## 2023-06-07 NOTE — PROGRESS NOTES
Follow Up Note     Date: 2023   Patient Name: Marnie nA  MRN: [id number]  : 1970     Primary Care Provider: Izabela Child MD     Chief Complaint   Patient presents with    Follow-up    Colon Polyps     History of present illness:   2023  Marnie An is a 52 y.o. female who is here today for follow up after colonoscopy. She did not have any problems after her colonoscopy. She has cut out her sparkling water and her reflux has significantly improved. She is no longer having reflux daily and doesn't have to take anything on a regular basis. Difficulty swallowing doing ok right now, not a significant problem at this time.    Interval History:  2023  The deny any abdominal pain, change in bowel habit, hematochezia or melena.  Weight is stable. Pt denies nausea vomiting or odynophagia.  She has a chronic history of dysphagia.  She had EGD done as per patient in 2017 at [facility] and was been told that she had a stricture that was dilated since then her dysphagia improved.  Still gets intermittent dysphagia to solids. She has significant reflux symptoms with heartburn almost every day but not taking any regular medications.  He takes only over-the-counter antacids as needed.   There is no history of anemia. No prior history of colonoscopy. No family history of colon cancer or any GI malignancy. No history of any abdominal surgery. Denies alcohol abuse or cigarette smoking.     Subjective      Past Medical History:   Diagnosis Date    Esophageal stricture     [facility] [place]    GERD (gastroesophageal reflux disease) 14    Hematuria 2021    CT SCAN and Cystoscopy with Dr. Fajardo    Hyperlipidemia     pt and family    Snores      Past Surgical History:   Procedure Laterality Date    BREAST BIOPSY Right 2012    Ultrasound guided    COLONOSCOPY N/A 3/10/2023    Procedure: COLONOSCOPY WITH POLYPECTOMY;  Surgeon: Patito Arias MD;   Patient Education  Growing well  Discussed focus Su[employer] pang assessment forms provided.  Will call to discuss once forms are reviewed  Will continue with counseling at school  Discussed HPV, does not want  Apply ketoconozole to chest daily, keep area dry  Will refer to  dermatology for rash on scalp     Well Child Exam 11 to 14 Years   About this topic   Your child's well child exam is a visit with the doctor to check your child's health. The doctor measures your child's weight and height, and may measure your child's body mass index (BMI). The doctor plots these numbers on a growth curve. The growth curve gives a picture of your child's growth at each visit. The doctor may listen to your child's heart, lungs, and belly. Your doctor will do a full exam of your child from the head to the toes.  Your child may also need shots or blood tests during this visit.  General   Growth and Development   Your doctor will ask you how your child is developing. The doctor will focus on the skills that most children your child's age are expected to do. During this time of your child's life, here are some things you can expect.  Physical development ? Your child may:  Show signs of maturing physically  Need reminders about drinking water when playing  Be a little clumsy while growing  Hearing, seeing, and talking ? Your child may:  Be able to see the long-term effects of actions  Understand many viewpoints  Begin to question and challenge existing rules  Want to help set household rules  Feelings and behavior ? Your child may:  Want to spend time alone or with friends rather than with family  Have an interest in dating and the opposite sex  Value the opinions of friends over parents' thoughts or ideas  Want to push the limits of what is allowed  Believe bad things wont happen to them  Feeding ? Your child needs:  To learn to make healthy choices when eating. Serve healthy foods like lean meats, fruits, vegetables, and whole  Location: [facility] ENDOSCOPY;  Service: Gastroenterology;  Laterality: N/A;    CYSTOSCOPY      ENDOSCOPY      KNEE ARTHROSCOPY  03/2014    right torn meniscus     REPLACEMENT TOTAL KNEE Left 11/29/2022    UPPER GASTROINTESTINAL ENDOSCOPY  1/1/17    VAGINAL DELIVERY N/A 2006     Family History   Problem Relation Age of Onset    Heart disease Father     Hyperlipidemia Father     Colon polyps Father     Hearing loss Mother     Heart disease Mother     Hyperlipidemia Mother     Hypertension Mother     Stroke Mother     Breast cancer Paternal Grandmother         80's    Ovarian cancer Neg Hx     Uterine cancer Neg Hx     Colon cancer Neg Hx      Social History     Socioeconomic History    Marital status:    Tobacco Use    Smoking status: Never    Smokeless tobacco: Never   Vaping Use    Vaping Use: Never used   Substance and Sexual Activity    Alcohol use: Yes     Alcohol/week: 1.0 standard drink     Types: 1 Drinks containing 0.5 oz of alcohol per week     Comment: social    Drug use: Never    Sexual activity: Yes     Partners: Male     Birth control/protection: Injection, Depo-provera       Current Outpatient Medications:     Acetaminophen (Tylenol) 325 MG capsule, Tylenol 325 mg capsule  Take by oral route., Disp: , Rfl:     halobetasol (ULTRAVATE) 0.05 % ointment, APPLY TWO TIMES A DAY    TO AFFECTED AREA AS NEEDED FOR 14 DAYS, Disp: , Rfl:     rosuvastatin (CRESTOR) 10 MG tablet, Take 1 tablet by mouth Daily. 200001, Disp: , Rfl:     Current Facility-Administered Medications:     medroxyPROGESTERone (DEPO-PROVERA) injection 150 mg, 150 mg, Intramuscular, Q3 Months, Ann-Marie Acevedo MD, 150 mg at 09/06/22 0912    Allergies   Allergen Reactions    E.E.S. [Erythromycin] GI Intolerance     Abdominal pain     The following portions of the patient's history were reviewed and updated as appropriate: allergies, current medications, past family history, past medical history, past social history, past surgical history and  grains. Help your child choose healthy foods when out to eat.  To start each day with a healthy breakfast  To limit soda, chips, candy, and foods that are high in fats and sugar  Healthy snacks available like fruit, cheese and crackers, or peanut butter  To eat meals as a part of the family. Turn the TV and cell phones off while eating. Talk about your day, rather than focusing on what your child is eating.  Sleep ? Your child:  Needs more sleep  Is likely sleeping about 8 to 10 hours in a row at night  Should be allowed to read each night before bed. Have your child brush and floss the teeth before going to bed as well.  Should limit TV and computers for the hour before bedtime  Keep cell phones, tablets, televisions, and other electronic devices out of bedrooms overnight. They interfere with sleep.  Needs a routine to make week nights easier. Encourage your child to get up at a normal time on weekends instead of sleeping late.  Shots or vaccines ? It is important for your child to get shots on time. This protects your child from very serious illnesses like pneumonia, blood and brain infections, tetanus, flu, or cancer. Your child may need:  HPV or human papillomavirus vaccine  Tdap or tetanus, diphtheria, and pertussis vaccine  Meningococcal vaccine  Influenza vaccine  Help for Parents   Activities.  Encourage your child to spend at least 1 hour each day being physically active.  Offer your child a variety of activities to take part in. Include music, sports, arts and crafts, and other things your child is interested in. Take care not to over schedule your child. One to 2 activities a week outside of school is often a good number for your child.  Make sure your child wears a helmet when using anything with wheels like skates, skateboard, bike, etc.  Encourage time spent with friends. Provide a safe area for this.  Here are some things you can do to help keep your child safe and healthy.  Talk to your child about  problem list.  Objective     Physical Exam  Vitals and nursing note reviewed.   Constitutional:       General: She is not in acute distress.     Appearance: Normal appearance. She is well-developed.   HENT:      Head: Normocephalic and atraumatic.      Mouth/Throat:      Mouth: Mucous membranes are not pale, not dry and not cyanotic.   Eyes:      General: Lids are normal.   Neck:      Trachea: Trachea normal.   Cardiovascular:      Rate and Rhythm: Normal rate.   Pulmonary:      Effort: Pulmonary effort is normal. No respiratory distress.      Breath sounds: Normal breath sounds.   Abdominal:      Tenderness: There is no abdominal tenderness.   Skin:     General: Skin is warm and dry.   Neurological:      Mental Status: She is alert and oriented to person, place, and time.   Psychiatric:         Mood and Affect: Mood normal.         Speech: Speech normal.         Behavior: Behavior normal. Behavior is cooperative.     Vitals:    06/07/23 1554   BP: 122/60   Temp: 97.7 °F (36.5 °C)   TempSrc: Infrared   Weight: 115 kg (253 lb)     Body mass index is 38.47 kg/m².     Results Review:   I reviewed the patient's new clinical results.    Admission on 03/10/2023, Discharged on 03/10/2023   Component Date Value Ref Range Status    HCG, Urine, QL 03/10/2023 Negative  Negative Final    Lot Number 03/10/2023 [id number]   Final    Internal Positive Control 03/10/2023 Passed  Positive, Passed Final    Internal Negative Control 03/10/2023 Negative  Negative, Passed Final    Expiration Date 03/10/2023 43,024   Final    Reference Lab Report 03/10/2023    Final                    Value:[facility]  [address] [place] [address]  Phone: [phone] or [phone]  Fax: [phone]  Niko Singh M.D., Medical Director    PATIENT NAME                                     LABORATORY NO.  [employer].                          [id number]  [id number]                                  the dangers of smoking, drinking alcohol, and using drugs. Do not allow anyone to smoke in your home or around your child.  Make sure your child uses a seat belt when riding in the car. Your child should ride in the back seat until 13 years of age.  Talk with your child about peer pressure. Help your child learn how to handle risky things friends may want to do.  Remind your child to use headphones responsibly. Limit how loud the volume is turned up. Never wear headphones, text, or use a cell phone while riding a bike or crossing the street.  Protect your child from gun injuries. If you have a gun, use a trigger lock. Keep the gun locked up and the bullets kept in a separate place.  Limit screen time for children to 1 to 2 hours per day. This includes TV, phones, computers, and video games.  Discuss social media safety  Parents need to think about:  Monitoring your child's computer use, especially when on the Internet  How to keep open lines of communication about unwanted touch, sex, and dating  How to continue to talk about puberty  Having your child help with some family chores to encourage responsibility within the family  Helping children make healthy choices  The next well child visit will most likely be in 1 year. At this visit, your doctor may:  Do a full check up on your child  Talk about school, friends, and social skills  Talk about sexuality and sexually-transmitted diseases  Talk about driving and safety  When do I need to call the doctor?   Fever of 100.4°F (38°C) or higher  Your child has not started puberty by age 14  Low mood, suddenly getting poor grades, or missing school  You are worried about your child's development  Where can I learn more?   Centers for Disease Control and Prevention  https://www.cdc.gov/ncbddd/childdevelopment/positiveparenting/adolescence.html   Centers for Disease Control and Prevention  https://www.cdc.gov/vaccines/parents/diseases/teen/index.html    KidsHealth  http://kidshealth.org/parent/growth/medical/checkup_11yrs.html#pay035   KidsHealth  http://kidshealth.org/parent/growth/medical/checkup_12yrs.html#yth087   KidsHealth  http://kidshealth.org/parent/growth/medical/checkup_13yrs.html#sbu525   KidsHealth  http://kidshealth.org/parent/growth/medical/checkup_14yrs.html#   Last Reviewed Date   2019-10-14  Consumer Information Use and Disclaimer   This information is not specific medical advice and does not replace information you receive from your health care provider. This is only a brief summary of general information. It does NOT include all information about conditions, illnesses, injuries, tests, procedures, treatments, therapies, discharge instructions or life-style choices that may apply to you. You must talk with your health care provider for complete information about your health and treatment options. This information should not be used to decide whether or not to accept your health care providers advice, instructions or recommendations. Only your health care provider has the knowledge and training to provide advice that is right for you.  Copyright   Copyright © 2021 UpToDate, Inc. and its affiliates and/or licensors. All rights reserved.    At 9 years old, children who have outgrown the booster seat may use the adult safety belt fastened correctly.   If you have an active [facility] account, please look for your well child questionnaire to come to your [facility] account before your next well child visit.   AGE                    SEX   N              CLIENT REF #  [place] [id number]        1970      F     [id number]      [id number]    [address]                        REQUESTING M.D.           ATTENDING M.D.         COPY TO.  [address]                                [place] [address]                         CARLOS LOPEZ  DATE COLLECTED            DATE RECEIVED          DATE REPORTED  03/10/2023                2023             03/15/2023    DIAGNOSIS:  CECUM POLYP:  Benign mucosal fold with prominent lymphoid aggregate  Negative                           for adenomatous or hyperplastic changes in routine and multiple  deeper levels    AV    REVIEWED, DIAGNOSED AND ELECTRONICALLY  SIGNED BY:    Veronique Dickerson D.O.  CPT CODES:  82032        Colonoscopy dated 3/10/2023 per Dr. Arias  - One 2 mm polyp in the cecum, removed with a cold snare. Resected and retrieved. Clinically hyperplastic  - Diverticulosis in the cecum.  - Non-bleeding internal hemorrhoids.  - The examined portion of the ileum was normal.  CECUM POLYP:   Benign mucosal fold with prominent lymphoid aggregate   Negative for adenomatous or hyperplastic changes in routine and multiple   deeper levels    Assessment / Plan      1. Gastroesophageal reflux disease with esophagitis without hemorrhage  2. Esophageal dysphagia  3. History of esophageal stricture  She has cut out sparkling water and her reflux has improved. She has not needed to take anything on a regular basis for her reflux since changing her diet. Difficulty swallowing doing ok right now, no significant problem. Per patient she had EGD with dilation in 2017 for stricture.   Anti-reflux measures.  May take over the counter antacids as needed.   EGD in the future if symptoms worsen.    4. Class 2 severe obesity due to excess calories with serious comorbidity and body mass index  (BMI) of 38.0 to 38.9 in adult  BMI 38.47  No recent labs or imaging to interpret. No history of elevated liver enzymes or liver disease per patient.   Recommend low fat diet, exercise and weight reduction.     5. Encounter for screening for malignant neoplasm of colon  Colonoscopy with 1 polyp removed, biopsy with benign mucosal fold. No family history of colon cancer.  Colonoscopy for screening in 10 years, 2033.     Patient Instructions   Antireflux measures: Avoid fried, fatty foods, alcohol, chocolate, coffee, tea,  soft drinks, all carbonated beverages (including sparkling water), peppermint and spearmint, spicy foods, tomatoes and tomato based foods, onion based foods, and garlic.   Other antireflux measures include weight reduction if overweight, avoiding tight clothing around the abdomen, elevating the head of the bed 6 inches with blocks under the head board, and don't drink or eat before going to bed and avoid lying down immediately after meals.  May take over the counter antacids as needed.   Colonoscopy for screening in 10 years, 2033.  High fiber, low fat diet with liberal water intake.   Advised to exercise 30 minutes 4-5 days per week.   Advised to lose 25-30 pounds in the next 6-12 months.  Follow up: The patient will call back  BOBBY Medrano  6/7/2023    Please note that portions of this note were completed with a voice recognition program.

## 2023-08-08 ENCOUNTER — CLINICAL SUPPORT (OUTPATIENT)
Dept: OBSTETRICS AND GYNECOLOGY | Facility: CLINIC | Age: 53
End: 2023-08-08
Payer: COMMERCIAL

## 2023-08-08 VITALS — HEIGHT: 68 IN | BODY MASS INDEX: 38.01 KG/M2 | WEIGHT: 250.8 LBS

## 2023-08-08 DIAGNOSIS — Z30.42 ENCOUNTER FOR SURVEILLANCE OF INJECTABLE CONTRACEPTIVE: Primary | ICD-10-CM

## 2023-08-08 NOTE — PROGRESS NOTES
DepoProvera Contraception Note  Marnie An is a 53 y.o. female here for her DepoProvera injection. Her LMP was unknown. She weighs 250.8lbs and she is 5feet 6inches tall. Marnie An is sexually active. The patient's last pap smear was 9/15/2022 that resulted in negative, HPV not done. Her last injection was 5/15/2023. Her past reactions to this injection include: none . It has been more than 12 weeks since her last injection. (12w1d) Her last annual exam was 9/15/2022.    Patient Questions  Have you ever passed out from an injection? No     Have you ever had a reaction to an injection? No    Are you allergic to any medications? Yes, Erythromycin    Have you been sick in the last month? No    Injection Details  Noted in the MAR, OFFICE SUPPLIED    Next Injection  Her next injection is 10/31/2023.    Electronically Signed By:  Maya San MA  08/08/2023

## 2023-10-31 ENCOUNTER — CLINICAL SUPPORT (OUTPATIENT)
Dept: OBSTETRICS AND GYNECOLOGY | Facility: CLINIC | Age: 53
End: 2023-10-31
Payer: COMMERCIAL

## 2023-10-31 VITALS — DIASTOLIC BLOOD PRESSURE: 74 MMHG | BODY MASS INDEX: 37.75 KG/M2 | WEIGHT: 248.2 LBS | SYSTOLIC BLOOD PRESSURE: 112 MMHG

## 2023-10-31 DIAGNOSIS — Z30.42 ENCOUNTER FOR SURVEILLANCE OF INJECTABLE CONTRACEPTIVE: Primary | ICD-10-CM

## 2023-10-31 DIAGNOSIS — Z12.31 BREAST CANCER SCREENING BY MAMMOGRAM: ICD-10-CM

## 2023-10-31 RX ORDER — MEDROXYPROGESTERONE ACETATE 150 MG/ML
150 INJECTION, SUSPENSION INTRAMUSCULAR ONCE
Status: COMPLETED | OUTPATIENT
Start: 2023-10-31 | End: 2023-10-31

## 2023-10-31 RX ADMIN — MEDROXYPROGESTERONE ACETATE 150 MG: 150 INJECTION, SUSPENSION INTRAMUSCULAR at 10:14

## 2023-10-31 NOTE — PROGRESS NOTES
DepoProvera Contraception Note  Marnie An is a 53 y.o. female here for her DepoProvera injection. Her LMP was absent due to depo. She weighs 248lbs and she is 5feet 8inches tall. Marnie An is sexually active. The patient's last pap smear was 9/15/22. Her last injection was 8/8/23. Her past reactions to this injection include: none . It has been 12 weeks since her last injection. Her last annual exam was 9/15/22.    Patient Questions  Have you ever passed out from an injection? No     Have you ever had a reaction to an injection? No    Are you allergic to any medications? No    Have you been sick in the last month? No    Injection Details  Noted in the MAR.    Next Injection  Her next injection is 1/23/2024.    Electronically Signed By:  Sybil Gomez RN  10/31/2023

## 2023-11-17 ENCOUNTER — HOSPITAL ENCOUNTER (OUTPATIENT)
Dept: MAMMOGRAPHY | Facility: HOSPITAL | Age: 53
Discharge: HOME | End: 2023-11-17
Admitting: OBSTETRICS & GYNECOLOGY
Payer: COMMERCIAL

## 2023-11-17 DIAGNOSIS — Z12.31 BREAST CANCER SCREENING BY MAMMOGRAM: ICD-10-CM

## 2023-11-17 PROCEDURE — 77067 SCR MAMMO BI INCL CAD: CPT

## 2023-11-17 PROCEDURE — 77063 BREAST TOMOSYNTHESIS BI: CPT

## 2024-01-01 NOTE — PROGRESS NOTES
Date last pap: 07/21/2020.  Last Depo-Provera: 10/2/2020.  Side Effects if any: none.  Serum HCG indicated? no.  Depo-Provera 150 mg IM given by: Arjun Santacruz RN.  Next appointment due Annual due after 7/31/2021  Next depo 12-13 weeks 4/20/2021 to 4/27  Weight 248 lbs  /168  Given right hip, pt tolerated well .  
[id number]

## 2024-01-23 ENCOUNTER — CLINICAL SUPPORT (OUTPATIENT)
Dept: OBSTETRICS AND GYNECOLOGY | Facility: CLINIC | Age: 54
End: 2024-01-23
Payer: COMMERCIAL

## 2024-01-23 VITALS — WEIGHT: 250 LBS | HEIGHT: 68 IN | BODY MASS INDEX: 37.89 KG/M2

## 2024-01-23 DIAGNOSIS — Z30.42 SURVEILLANCE FOR DEPO-PROVERA CONTRACEPTION: Primary | ICD-10-CM

## 2024-01-23 PROCEDURE — 96372 THER/PROPH/DIAG INJ SC/IM: CPT | Performed by: OBSTETRICS & GYNECOLOGY

## 2024-01-23 RX ORDER — MEDROXYPROGESTERONE ACETATE 150 MG/ML
150 INJECTION, SUSPENSION INTRAMUSCULAR ONCE
Status: COMPLETED | OUTPATIENT
Start: 2024-01-23 | End: 2024-01-23

## 2024-01-23 RX ADMIN — MEDROXYPROGESTERONE ACETATE 150 MG: 150 INJECTION, SUSPENSION INTRAMUSCULAR at 11:19

## 2024-01-23 NOTE — PROGRESS NOTES
DepoProvera Contraception Note  Marnie An is a 53 y.o. female here for her DepoProvera injection. She weighs 250lbs and she is 5feet 7inches tall.  The patient's last pap smear was 9/22. Her last injection was 10/31/2023. Her past reactions to this injection include: none . It has been more than 12 weeks since her last injection. Her last annual exam was in 9-22.She is scheduled for an annual exam with Dr. Acevedo on 4/16/24.     Patient Questions  Have you ever passed out from an injection? No     Have you ever had a reaction to an injection? No    Are you allergic to any medications? Yes    Have you been sick in the last month? No    Injection Details  Noted in the MAR.    Next Injection  Her next injection is Erythromycin.    Electronically Signed By:  Sandy Montgomery MA  01/23/2024

## 2024-04-01 ENCOUNTER — TRANSCRIBE ORDERS (OUTPATIENT)
Dept: ADMINISTRATIVE | Facility: HOSPITAL | Age: 54
End: 2024-04-01
Payer: COMMERCIAL

## 2024-04-01 DIAGNOSIS — E04.1 THYROID CYST: Primary | ICD-10-CM

## 2024-04-18 ENCOUNTER — OFFICE VISIT (OUTPATIENT)
Dept: OBSTETRICS AND GYNECOLOGY | Facility: CLINIC | Age: 54
End: 2024-04-18
Payer: COMMERCIAL

## 2024-04-18 VITALS
SYSTOLIC BLOOD PRESSURE: 112 MMHG | BODY MASS INDEX: 37.89 KG/M2 | DIASTOLIC BLOOD PRESSURE: 70 MMHG | HEIGHT: 68 IN | WEIGHT: 250 LBS

## 2024-04-18 DIAGNOSIS — R23.2 HOT FLASHES: ICD-10-CM

## 2024-04-18 DIAGNOSIS — Z30.42 ENCOUNTER FOR SURVEILLANCE OF INJECTABLE CONTRACEPTIVE: ICD-10-CM

## 2024-04-18 DIAGNOSIS — Z12.31 BREAST CANCER SCREENING BY MAMMOGRAM: ICD-10-CM

## 2024-04-18 DIAGNOSIS — Z01.419 ENCOUNTER FOR GYNECOLOGICAL EXAMINATION WITHOUT ABNORMAL FINDING: Primary | ICD-10-CM

## 2024-04-18 RX ORDER — MEDROXYPROGESTERONE ACETATE 150 MG/ML
150 INJECTION, SUSPENSION INTRAMUSCULAR ONCE
Status: COMPLETED | OUTPATIENT
Start: 2024-04-18 | End: 2024-04-18

## 2024-04-18 RX ORDER — MULTIVIT WITH MINERALS/LUTEIN
250 TABLET ORAL DAILY
COMMUNITY

## 2024-04-18 RX ADMIN — MEDROXYPROGESTERONE ACETATE 150 MG: 150 INJECTION, SUSPENSION INTRAMUSCULAR at 14:45

## 2024-04-18 NOTE — PROGRESS NOTES
Gynecologic Annual Exam Note        CC - Here for Annual Exam.     Subjective     HPI  Marnie An is a 53 y.o. female, , who presents for annual well woman exam.  She is perimenopausal.  Patient reports problems with: none.  Partner Status: Marital Status: .  New Partners since last visit: no.   She may have mild VMS, not sure.  She denies  issues with urinary incontinence.     The patient does not have any complaints today. Patient was given her Depo Provera 150mg injection today and will RTO 12 weeks (7/10/24) for next injection.    She has concerns about domestic violence: no.        Additional OB/GYN History     History of abnormal Pap smear: no  Family history of uterine, colon, breast, or ovarian cancer: yes - PGM breast ca  Performs monthly Self-Breast Exam: yes  Exercises Regularly: no  Feelings of Anxiety or Depression: no    Last Pap : 9/15/2022 negative  Last Completed Pap Smear            PAP SMEAR (Every 3 Years) Next due on 9/15/2025      09/15/2022  LIQUID-BASED PAP SMEAR, [facility] ([facility])    2021  SCANNED - PAP SMEAR    2020  Done - in [place]    2018  Done - in [place]                  Last mammogram: 23 negative  Last Completed Mammogram            Ordered - MAMMOGRAM (Yearly) Ordered on 2023  Mammo Screening Digital Tomosynthesis Bilateral With CAD    2022  Mammo Screening Digital Tomosynthesis Bilateral With CAD    10/11/2021  Mammo Screening Digital Tomosynthesis Bilateral With CAD    2017  Mammo Screening Digital Tomosynthesis Bilateral With CAD    09/15/2016  Mammo screening digital tomosynthesis bilateral w cad    Only the first 5 history entries have been loaded, but more history exists.                  Last colonoscopy: 3/10/23 Diverticulitis RTO 7-10 yrs  Last Completed Colonoscopy            COLORECTAL CANCER SCREENING (COLONOSCOPY - Every 10 Years) Next due on 3/10/2033      03/10/2023   COLONOSCOPY    03/10/2023  Surgical Procedure: COLONOSCOPY                  Last DEXA: many years ago    Tobacco Usage?: No   OB History          1    Para   1    Term   0       1    AB        Living   1         SAB        IAB        Ectopic        Molar        Multiple        Live Births   1                Health Maintenance   Topic Date Due    TDAP/TD VACCINES (1 - Tdap) Never done    HEPATITIS C SCREENING  Never done    ANNUAL PHYSICAL  Never done    ZOSTER VACCINE (1 of 2) Never done    BMI FOLLOWUP  2022    DXA SCAN  10/29/2022    COVID-19 Vaccine ( season) 2023    Annual Gynecologic Pelvic and Breast Exam  2023    INFLUENZA VACCINE  2024    MAMMOGRAM  2024    LIPID PANEL  2025    PAP SMEAR  09/15/2025    COLORECTAL CANCER SCREENING  03/10/2033    Pneumococcal Vaccine 0-64  Aged Out       The additional following portions of the patient's history were reviewed and updated as appropriate: allergies, current medications, past family history, past medical history, past social history, past surgical history, and problem list.    Past Medical History:   Diagnosis Date    Esophageal stricture     stretched, [facility]    GERD (gastroesophageal reflux disease) 14    Hematuria 2021    CT SCAN and Cystoscopy with Dr. Fajardo    Hyperlipidemia     pt and family    Snores         Past Surgical History:   Procedure Laterality Date    BREAST BIOPSY Right 2012    Ultrasound guided    COLONOSCOPY N/A 3/10/2023    Procedure: COLONOSCOPY WITH POLYPECTOMY;  Surgeon: Patito Arias MD;  Location: [facility] ENDOSCOPY;  Service: Gastroenterology;  Laterality: N/A;    CYSTOSCOPY      ENDOSCOPY      KNEE ARTHROSCOPY  2014    right torn meniscus     REPLACEMENT TOTAL KNEE Left 2022    UPPER GASTROINTESTINAL ENDOSCOPY  19    VAGINAL DELIVERY N/A         Review of Systems    I have reviewed and agree with the HPI, ROS, and historical  "information as entered above. Ann-Marie Acevedo MD    Objective   /70   Ht 172.7 cm (68\")   Wt 113 kg (250 lb)   Breastfeeding No   BMI 38.01 kg/m²     Physical Exam  Vitals and nursing note reviewed. Exam conducted with a chaperone present.   Constitutional:       General: She is awake.   HENT:      Head: Normocephalic and atraumatic.   Neck:      Thyroid: Thyromegaly present. No thyroid mass or thyroid tenderness.   Cardiovascular:      Rate and Rhythm: Normal rate.      Heart sounds: No murmur heard.     No friction rub. No gallop.   Pulmonary:      Effort: Pulmonary effort is normal.      Breath sounds: Normal breath sounds. No stridor. No wheezing, rhonchi or rales.   Chest:      Chest wall: No mass or tenderness.   Breasts:     Right: Normal. No bleeding, inverted nipple, mass, nipple discharge, skin change or tenderness.      Left: Normal. No bleeding, inverted nipple, mass, nipple discharge, skin change or tenderness.   Abdominal:      Palpations: Abdomen is soft.      Tenderness: There is no guarding or rebound.      Hernia: There is no hernia in the left inguinal area or right inguinal area.   Genitourinary:     General: Normal vulva.      Pubic Area: No rash.       Labia:         Right: No rash, tenderness, lesion or injury.         Left: No rash, tenderness, lesion or injury.       Urethra: No prolapse, urethral swelling or urethral lesion.      Vagina: No foreign body. No vaginal discharge, erythema, tenderness, bleeding or lesions.      Cervix: No cervical motion tenderness, discharge, friability, lesion, erythema or cervical bleeding.      Uterus: Normal. Not deviated, not enlarged, not fixed and not tender.       Adnexa: Right adnexa normal and left adnexa normal.        Right: No mass, tenderness or fullness.          Left: No mass, tenderness or fullness.        Rectum: No external hemorrhoid.   Musculoskeletal:      Cervical back: Normal range of motion.   Lymphadenopathy:      Upper Body: "      Right upper body: No supraclavicular or axillary adenopathy.      Left upper body: No supraclavicular or axillary adenopathy.   Skin:     General: Skin is warm.   Neurological:      Mental Status: She is alert and oriented to person, place, and time.   Psychiatric:         Mood and Affect: Mood and affect normal.         Speech: Speech normal.         Assessment & Plan     Assessment     Problem List Items Addressed This Visit       Encounter for surveillance of injectable contraceptive    Relevant Medications    medroxyPROGESTERone (DEPO-PROVERA) injection 150 mg (Completed)     Other Visit Diagnoses       Encounter for gynecological examination without abnormal finding    -  Primary    Breast cancer screening by mammogram        Relevant Orders    Mammo Screening Digital Tomosynthesis Bilateral With CAD    Hot flashes        Relevant Orders    Luteinizing Hormone    Follicle Stimulating Hormone    Estradiol            Plan     Reviewed monthly self breast exams.  Instructed to call with lumps, pain, or breast discharge.  Yearly mammograms ordered.  Ordered mammogram today.  Recommended use of Vitamin D and getting adequate calcium in her diet. (1500mg)  Osteoporosis screening ordered today.  Reviewed exercise as a preventative health measures.   RTC in 1 year or PRN with problems.  Depo injection today, will return to office for DEXA when next injection due.  Thyroid enlarged, has appt for thyroid u/s    Ann-Marie Acevedo MD  04/18/2024

## 2024-04-18 NOTE — PROGRESS NOTES
Date last pap: 9/15/2022 NEGATIVE.  Last Depo-Provera: 1/24/24.  Side Effects if any: NONE.  Serum HCG indicated? NO.  Depo-Provera 150 mg IM given by: CHYNA ARROYO CMA.  Next appointment due 12 WEEKS 7-10-24.

## 2024-04-19 LAB
ESTRADIOL SERPL-MCNC: 5.8 PG/ML
FSH SERPL-ACNC: 25.8 MIU/ML
LH SERPL-ACNC: 9.6 MIU/ML

## 2024-04-22 NOTE — PROGRESS NOTES
Please call patient about results.   Fsh just barely in  menopausal range, would say she is perimenopausal, so would continue contraception for now.

## 2024-04-23 ENCOUNTER — TELEPHONE (OUTPATIENT)
Dept: OBSTETRICS AND GYNECOLOGY | Facility: CLINIC | Age: 54
End: 2024-04-23
Payer: COMMERCIAL

## 2024-04-23 NOTE — TELEPHONE ENCOUNTER
"Returned patient's call. Patient of Dr. Acevedo.   Informed patient of Dr. Acevedo' results note: \"Please call patient about results.   Fsh just barely in  menopausal range, would say she is perimenopausal, so would continue contraception for now.\" Patient v/u and agreed.    "

## 2024-06-03 ENCOUNTER — HOSPITAL ENCOUNTER (OUTPATIENT)
Dept: ULTRASOUND IMAGING | Facility: HOSPITAL | Age: 54
Discharge: HOME | End: 2024-06-03
Admitting: FAMILY MEDICINE
Payer: COMMERCIAL

## 2024-06-03 DIAGNOSIS — E04.1 THYROID CYST: ICD-10-CM

## 2024-06-03 PROCEDURE — 76536 US EXAM OF HEAD AND NECK: CPT

## 2024-07-10 ENCOUNTER — CLINICAL SUPPORT (OUTPATIENT)
Dept: OBSTETRICS AND GYNECOLOGY | Facility: CLINIC | Age: 54
End: 2024-07-10
Payer: COMMERCIAL

## 2024-07-10 VITALS — WEIGHT: 250 LBS | HEIGHT: 68 IN | BODY MASS INDEX: 37.89 KG/M2

## 2024-07-10 DIAGNOSIS — Z13.820 SCREENING FOR OSTEOPOROSIS: Primary | ICD-10-CM

## 2024-07-10 DIAGNOSIS — Z30.42 SURVEILLANCE FOR DEPO-PROVERA CONTRACEPTION: Primary | ICD-10-CM

## 2024-07-10 RX ORDER — MEDROXYPROGESTERONE ACETATE 150 MG/ML
150 INJECTION, SUSPENSION INTRAMUSCULAR ONCE
Status: COMPLETED | OUTPATIENT
Start: 2024-07-10 | End: 2024-07-10

## 2024-07-10 RX ADMIN — MEDROXYPROGESTERONE ACETATE 150 MG: 150 INJECTION, SUSPENSION INTRAMUSCULAR at 11:23

## 2024-07-10 NOTE — PROGRESS NOTES
DepoProvera Contraception Note  Marnie An is a 54 y.o. female here for her DepoProvera injection. Her periods are absent secondary to birth control. She weighs 250lbs and she is 5feet 8inches tall. Marnie An is sexually active. The patient's last pap smear was 09/15/2022. Her last injection was 04/18/2024. Her past reactions to this injection include: none . It has been 12 weeks since her last injection. Her last annual exam was 04/18/2024.    Patient Questions  Have you ever passed out from an injection? No     Have you ever had a reaction to an injection? No    Are you allergic to any medications? Yes, Erythromycin.    Have you been sick in the last month? No    Injection Details  Noted in the MAR.    Next Injection  Her next injection is 10/02/2024.    Electronically Signed By:  Pam Ríos RN  07/10/2024

## 2024-07-15 ENCOUNTER — TELEPHONE (OUTPATIENT)
Dept: OBSTETRICS AND GYNECOLOGY | Facility: CLINIC | Age: 54
End: 2024-07-15
Payer: COMMERCIAL

## 2024-07-15 NOTE — TELEPHONE ENCOUNTER
Per message from Dr Acevedo:    Can someone please call pt and notify her that her dexa showed mild osteopenia. She should get adequate calcium intake 1200 mg daily (diet best) and take Vit D 1000 IU at least, weight bearing exercises. I would recommend she switch from Depo to something different and hopefully that will help with the bones before menopause.     Attempted to call patient, voicemail left for a callback.      -Arnaud Barbour

## 2024-10-04 ENCOUNTER — CLINICAL SUPPORT (OUTPATIENT)
Dept: OBSTETRICS AND GYNECOLOGY | Facility: CLINIC | Age: 54
End: 2024-10-04
Payer: COMMERCIAL

## 2024-10-04 VITALS
SYSTOLIC BLOOD PRESSURE: 110 MMHG | WEIGHT: 249.4 LBS | DIASTOLIC BLOOD PRESSURE: 70 MMHG | BODY MASS INDEX: 37.8 KG/M2 | HEIGHT: 68 IN

## 2024-10-04 DIAGNOSIS — Z30.42 ENCOUNTER FOR DEPO-PROVERA CONTRACEPTION: Primary | ICD-10-CM

## 2024-10-04 RX ORDER — MEDROXYPROGESTERONE ACETATE 150 MG/ML
150 INJECTION, SUSPENSION INTRAMUSCULAR ONCE
Status: COMPLETED | OUTPATIENT
Start: 2024-10-04 | End: 2024-10-04

## 2024-10-04 RX ADMIN — MEDROXYPROGESTERONE ACETATE 150 MG: 150 INJECTION, SUSPENSION INTRAMUSCULAR at 16:13

## 2024-10-04 NOTE — PROGRESS NOTES
DepoProvera Contraception Note  Marnie An is a 54 y.o. female here for her DepoProvera injection. Her periods are absent secondary to birth control. She weighs 249.4lbs and she is 5feet 8inches tall. Marnie An is sexually active. The patient's last pap smear was 9/15/22. Her last injection was 7/10/24. Her past reactions to this injection include: none . It has been 12 weeks since her last injection. Her last annual exam was 4/18/24.    Patient Questions  Have you ever passed out from an injection? No     Have you ever had a reaction to an injection? No    Are you allergic to any medications? Yes, Erythromycin    Have you been sick in the last month? No    Injection Details  Noted in the MAR.    Next Injection  Her next injection is 12/27/24.    Electronically Signed By:  Alisson Hunter RN  10/04/2024

## 2024-12-27 ENCOUNTER — CLINICAL SUPPORT (OUTPATIENT)
Dept: OBSTETRICS AND GYNECOLOGY | Facility: CLINIC | Age: 54
End: 2024-12-27
Payer: COMMERCIAL

## 2024-12-27 VITALS
SYSTOLIC BLOOD PRESSURE: 126 MMHG | WEIGHT: 252 LBS | HEIGHT: 68 IN | DIASTOLIC BLOOD PRESSURE: 72 MMHG | BODY MASS INDEX: 38.19 KG/M2

## 2024-12-27 DIAGNOSIS — Z30.42 ENCOUNTER FOR DEPO-PROVERA CONTRACEPTION: Primary | ICD-10-CM

## 2024-12-27 RX ORDER — MEDROXYPROGESTERONE ACETATE 150 MG/ML
150 INJECTION, SUSPENSION INTRAMUSCULAR ONCE
Status: COMPLETED | OUTPATIENT
Start: 2024-12-27 | End: 2024-12-27

## 2024-12-27 RX ADMIN — MEDROXYPROGESTERONE ACETATE 150 MG: 150 INJECTION, SUSPENSION INTRAMUSCULAR at 14:13

## 2024-12-27 NOTE — PROGRESS NOTES
DepoProvera Contraception Note  Marnie An is a 54 y.o. female here for her DepoProvera injection.  Her periods are absent secondary to birth control. She weighs 252lbs and she is 5feet 8inches tall. Marnie An is sexually active. The patient's last pap smear was 9/15/22. Her last injection was 10/4/24. Her past reactions to this injection include: none . It has been  12 weeks since her last injection. Her last annual exam was 4/18/24.    Patient Questions  Have you ever passed out from an injection? No     Have you ever had a reaction to an injection? No    Are you allergic to any medications? Yes, Erythromycin     Have you been sick in the last month? No    Injection Details  Noted in the MAR.    Next Injection  Her next injection is 3/21/25.    Electronically Signed By:  Alisson Hunter RN  12/27/2024

## 2024-12-30 ENCOUNTER — HOSPITAL ENCOUNTER (OUTPATIENT)
Dept: MAMMOGRAPHY | Facility: HOSPITAL | Age: 54
Discharge: HOME | End: 2024-12-30
Admitting: OBSTETRICS & GYNECOLOGY
Payer: COMMERCIAL

## 2024-12-30 DIAGNOSIS — Z12.31 BREAST CANCER SCREENING BY MAMMOGRAM: ICD-10-CM

## 2024-12-30 PROCEDURE — 77063 BREAST TOMOSYNTHESIS BI: CPT

## 2024-12-30 PROCEDURE — 77067 SCR MAMMO BI INCL CAD: CPT

## 2025-01-02 PROCEDURE — 77063 BREAST TOMOSYNTHESIS BI: CPT | Performed by: RADIOLOGY

## 2025-01-02 PROCEDURE — 77067 SCR MAMMO BI INCL CAD: CPT | Performed by: RADIOLOGY

## 2025-03-21 ENCOUNTER — CLINICAL SUPPORT (OUTPATIENT)
Dept: OBSTETRICS AND GYNECOLOGY | Facility: CLINIC | Age: 55
End: 2025-03-21
Payer: COMMERCIAL

## 2025-03-21 VITALS
BODY MASS INDEX: 38.58 KG/M2 | SYSTOLIC BLOOD PRESSURE: 124 MMHG | WEIGHT: 254.6 LBS | HEIGHT: 68 IN | DIASTOLIC BLOOD PRESSURE: 80 MMHG

## 2025-03-21 DIAGNOSIS — Z30.42 ENCOUNTER FOR DEPO-PROVERA CONTRACEPTION: Primary | ICD-10-CM

## 2025-03-21 RX ORDER — MEDROXYPROGESTERONE ACETATE 150 MG/ML
150 INJECTION, SUSPENSION INTRAMUSCULAR ONCE
Status: COMPLETED | OUTPATIENT
Start: 2025-03-21 | End: 2025-03-21

## 2025-03-21 RX ADMIN — MEDROXYPROGESTERONE ACETATE 150 MG: 150 INJECTION, SUSPENSION INTRAMUSCULAR at 14:00

## 2025-03-21 NOTE — PROGRESS NOTES
DepoProvera Contraception Note  Marnie An is a 54 y.o. female here for her DepoProvera injection. Her periods are absent secondary to birth control. She weighs 254.6lbs and she is 5feet 8inches tall. Marnie An is sexually active. The patient's last pap smear was 9/15/22. Her last injection was 12/27/24. Her past reactions to this injection include: none . It has been less than 12 weeks since her last injection. Her last annual exam was 4/18/24.    Patient Questions  Have you ever passed out from an injection? No     Have you ever had a reaction to an injection? No    Are you allergic to any medications? Yes, Erythromycin    Have you been sick in the last month? No    Injection Details  Noted in the MAR.    Next Injection  Her next injection is 6/13/25.    Electronically Signed By:  Alisson Hunter RN  03/21/2025

## 2025-06-16 ENCOUNTER — OFFICE VISIT (OUTPATIENT)
Dept: OBSTETRICS AND GYNECOLOGY | Facility: CLINIC | Age: 55
End: 2025-06-16
Payer: COMMERCIAL

## 2025-06-16 VITALS
SYSTOLIC BLOOD PRESSURE: 110 MMHG | DIASTOLIC BLOOD PRESSURE: 78 MMHG | HEIGHT: 68 IN | WEIGHT: 255 LBS | BODY MASS INDEX: 38.65 KG/M2

## 2025-06-16 DIAGNOSIS — Z30.42 ENCOUNTER FOR DEPO-PROVERA CONTRACEPTION: Primary | ICD-10-CM

## 2025-06-16 DIAGNOSIS — Z01.419 WOMEN'S ANNUAL ROUTINE GYNECOLOGICAL EXAMINATION: ICD-10-CM

## 2025-06-16 DIAGNOSIS — N91.2 AMENORRHEA: ICD-10-CM

## 2025-06-16 DIAGNOSIS — Z12.31 BREAST CANCER SCREENING BY MAMMOGRAM: ICD-10-CM

## 2025-06-16 RX ORDER — MEDROXYPROGESTERONE ACETATE 150 MG/ML
150 INJECTION, SUSPENSION INTRAMUSCULAR ONCE
Status: COMPLETED | OUTPATIENT
Start: 2025-06-16 | End: 2025-06-16

## 2025-06-16 RX ORDER — LEVETIRACETAM 500 MG/1
500 TABLET ORAL 2 TIMES DAILY
COMMUNITY

## 2025-06-16 RX ORDER — MEDROXYPROGESTERONE ACETATE 150 MG/ML
150 INJECTION, SUSPENSION INTRAMUSCULAR
COMMUNITY

## 2025-06-16 RX ADMIN — MEDROXYPROGESTERONE ACETATE 150 MG: 150 INJECTION, SUSPENSION INTRAMUSCULAR at 16:50

## 2025-06-16 NOTE — PROGRESS NOTES
Gynecologic Annual Exam Note          CC - Here for annual exam.     Subjective     HPI  Marnie An is a 55 y.o. female, , who presents for annual well woman exam.  She is perimenopausal.   No LMP recorded. Patient has had an injection.  Periods are absent secondary to birth control.  Patient reports problems with: none.      Partner Status: Marital Status: .  New Partners since last visit: no.  The patient Reports mild vasomotor symptoms.     The patient does not have any complaints today.    She exercises regularly: no.  She has concerns about domestic violence: no.      Additional OB/GYN History   Current contraception: contraceptive methods: Depo-Provera injections  Desires to: continue contraception  History of abnormal Pap smear: no  Family history of uterine, colon, breast, or ovarian cancer:  yes - paternal GM - breast  Performs monthly Self-Breast Exam: no  Feelings of Anxiety or Depression: no    Last Pap : 09/15/2022 Negative  Last Completed Pap Smear            Awaiting Completion       PAP SMEAR (Every 3 Years) Order placed this encounter      2025  Order placed for LIQUID-BASED PAP SMEAR WITH HPV GENOTYPING REGARDLESS OF INTERPRETATION ([facility]) by Ann-Marie Acevedo MD    09/15/2022  LIQUID-BASED PAP SMEAR, [facility] ([facility])    2021  SCANNED - PAP SMEAR    2020  Done - in [place]    2018  Done - in [place]     Only the first 5 history entries have been loaded, but more history exists.                          Last mammogram: 2024 Done at . Report in chart  Last Completed Mammogram            Awaiting Completion       MAMMOGRAM (Every 2 Years) Order placed this encounter      2025  Order placed for Mammo Screening Digital Tomosynthesis Bilateral With CAD by Ann-Marie Acevedo MD    2024  Mammo Screening Digital Tomosynthesis Bilateral With CAD    2023  Mammo Screening Digital Tomosynthesis Bilateral With CAD     2022  Mammo Screening Digital Tomosynthesis Bilateral With CAD    10/11/2021  Mammo Screening Digital Tomosynthesis Bilateral With CAD      Only the first 5 history entries have been loaded, but more history exists.                            Last colonoscopy: 03/10/2023 Repeat 7-10 years  Last Completed Colonoscopy            Needs Review       COLORECTAL CANCER SCREENING (COLONOSCOPY - Every 10 Years) Tentatively due on 3/10/2033      03/10/2023  Surgical Procedure: COLONOSCOPY    03/10/2023  COLONOSCOPY                          DEXA: 07/10/2024, Osteopenia    Tobacco Usage?: No   OB History          1    Para   1    Term   0       1    AB        Living   1         SAB        IAB        Ectopic        Molar        Multiple        Live Births   1                  The additional following portions of the patient's history were reviewed and updated as appropriate: allergies, current medications, past family history, past medical history, past social history, past surgical history, and problem list.    Past Medical History:   Diagnosis Date    Esophageal stricture     stretched, [facility]    GERD (gastroesophageal reflux disease) 14    Hematuria 2021    CT SCAN and Cystoscopy with Dr. Fajardo    Hyperlipidemia     pt and family    Snores         Past Surgical History:   Procedure Laterality Date    BREAST BIOPSY Right 2012    Ultrasound guided    COLONOSCOPY N/A 3/10/2023    Procedure: COLONOSCOPY WITH POLYPECTOMY;  Surgeon: Patito Arias MD;  Location: [facility] ENDOSCOPY;  Service: Gastroenterology;  Laterality: N/A;    CYSTOSCOPY      ENDOSCOPY      KNEE ARTHROSCOPY  2014    right torn meniscus     REPLACEMENT TOTAL KNEE Left 2022    UPPER GASTROINTESTINAL ENDOSCOPY  19    VAGINAL DELIVERY N/A         Review of Systems    I have reviewed and agree with the HPI, ROS, and historical information as entered above. Ann-Marie Acevedo MD    Objective   /78   Ht  "172.7 cm (67.99\")   Wt 116 kg (255 lb)   BMI 38.78 kg/m²     Physical Exam  Vitals and nursing note reviewed. Exam conducted with a chaperone present.   Constitutional:       General: She is awake.   HENT:      Head: Normocephalic and atraumatic.   Neck:      Thyroid: Thyromegaly present. No thyroid mass or thyroid tenderness.   Cardiovascular:      Rate and Rhythm: Normal rate.      Heart sounds: No murmur heard.     No friction rub. No gallop.   Pulmonary:      Effort: Pulmonary effort is normal.      Breath sounds: Normal breath sounds. No stridor. No wheezing, rhonchi or rales.   Chest:      Chest wall: No mass or tenderness.   Breasts:     Right: Normal. No bleeding, inverted nipple, mass, nipple discharge, skin change or tenderness.      Left: Normal. No bleeding, inverted nipple, mass, nipple discharge, skin change or tenderness.   Abdominal:      Palpations: Abdomen is soft.      Tenderness: There is no guarding or rebound.      Hernia: There is no hernia in the left inguinal area or right inguinal area.   Genitourinary:     General: Normal vulva.      Pubic Area: No rash.       Labia:         Right: No rash, tenderness, lesion or injury.         Left: No rash, tenderness, lesion or injury.       Urethra: No prolapse, urethral swelling or urethral lesion.      Vagina: No foreign body. No vaginal discharge, erythema, tenderness, bleeding or lesions.      Cervix: No cervical motion tenderness, discharge, friability, lesion, erythema or cervical bleeding.      Uterus: Normal. Not deviated, not enlarged, not fixed and not tender.       Adnexa: Right adnexa normal and left adnexa normal.        Right: No mass, tenderness or fullness.          Left: No mass, tenderness or fullness.        Rectum: No external hemorrhoid.   Musculoskeletal:      Cervical back: Normal range of motion.   Lymphadenopathy:      Upper Body:      Right upper body: No supraclavicular or axillary adenopathy.      Left upper body: No " supraclavicular or axillary adenopathy.   Skin:     General: Skin is warm.   Neurological:      Mental Status: She is alert and oriented to person, place, and time.   Psychiatric:         Mood and Affect: Mood and affect normal.         Speech: Speech normal.         Assessment & Plan     Assessment     Problem List Items Addressed This Visit    None  Visit Diagnoses         Women's annual routine gynecological examination    -  Primary    Relevant Orders    LIQUID-BASED PAP SMEAR WITH HPV GENOTYPING REGARDLESS OF INTERPRETATION ([facility])      Breast cancer screening by mammogram        Relevant Orders    Mammo Screening Digital Tomosynthesis Bilateral With CAD      Amenorrhea        Relevant Orders    Luteinizing Hormone    Follicle Stimulating Hormone    Estradiol            Plan     Reviewed monthly self breast exams.  Instructed to call with lumps, pain, or breast discharge.  Yearly mammograms ordered.  Ordered mammogram today.  RTC in 1 year or PRN with problems.  Thyroid followed by PCP with u/s, has known cysts and enlargement.  Will check labs today, discussed stopping Depo when we think she is menopausal.      Ann-Marie Acevedo MD  06/16/2025

## 2025-06-17 LAB
ESTRADIOL SERPL-MCNC: <5 PG/ML
FSH SERPL-ACNC: 22.1 MIU/ML
LH SERPL-ACNC: 7.8 MIU/ML

## 2025-06-18 LAB — REF LAB TEST METHOD: NORMAL

## 2025-08-26 ENCOUNTER — LAB (OUTPATIENT)
Dept: OBSTETRICS AND GYNECOLOGY | Facility: CLINIC | Age: 55
End: 2025-08-26
Payer: COMMERCIAL

## 2025-08-26 DIAGNOSIS — N91.2 ABSENCE OF MENSTRUATION: Primary | ICD-10-CM

## 2025-08-27 LAB
ESTRADIOL SERPL-MCNC: 5.5 PG/ML
FSH SERPL-ACNC: 23.3 MIU/ML

## 2025-08-28 LAB — MIS SERPL-MCNC: <0.015 NG/ML

## (undated) DEVICE — VLV SXN AIR/H2O ORCAPOD3 1P/U STRL

## (undated) DEVICE — LUBE JELLY PK/2.75GM STRL BX/144

## (undated) DEVICE — ENDOSCOPY PORT CONNECTOR FOR OLYMPUS® SCOPES: Brand: ERBE

## (undated) DEVICE — HYBRID TUBING/CAP SET FOR OLYMPUS® SCOPES: Brand: ERBE

## (undated) DEVICE — Device

## (undated) DEVICE — FRCP BX RADJAW4 NDL 2.8 240 STD OG